# Patient Record
Sex: FEMALE | Race: WHITE | NOT HISPANIC OR LATINO | Employment: OTHER | URBAN - NONMETROPOLITAN AREA
[De-identification: names, ages, dates, MRNs, and addresses within clinical notes are randomized per-mention and may not be internally consistent; named-entity substitution may affect disease eponyms.]

---

## 2024-10-10 ENCOUNTER — APPOINTMENT (OUTPATIENT)
Dept: GENERAL RADIOLOGY | Facility: HOSPITAL | Age: 69
End: 2024-10-10
Payer: MEDICARE

## 2024-10-10 ENCOUNTER — APPOINTMENT (OUTPATIENT)
Dept: CT IMAGING | Facility: HOSPITAL | Age: 69
End: 2024-10-10
Payer: MEDICARE

## 2024-10-10 ENCOUNTER — HOSPITAL ENCOUNTER (OUTPATIENT)
Facility: HOSPITAL | Age: 69
Setting detail: OBSERVATION
Discharge: HOME | End: 2024-10-11
Attending: FAMILY MEDICINE | Admitting: FAMILY MEDICINE
Payer: MEDICARE

## 2024-10-10 DIAGNOSIS — I77.3 FIBROMUSCULAR DYSPLASIA OF CAROTID ARTERY: ICD-10-CM

## 2024-10-10 DIAGNOSIS — I69.328: ICD-10-CM

## 2024-10-10 DIAGNOSIS — R03.0 ELEVATED BLOOD PRESSURE READING: ICD-10-CM

## 2024-10-10 DIAGNOSIS — Z74.09 IMPAIRED MOBILITY: ICD-10-CM

## 2024-10-10 DIAGNOSIS — H53.9 VISUAL DISTURBANCES: ICD-10-CM

## 2024-10-10 DIAGNOSIS — R29.90 STROKE-LIKE SYMPTOMS: Primary | ICD-10-CM

## 2024-10-10 LAB
ALBUMIN SERPL-MCNC: 4.2 G/DL (ref 3.5–5.2)
ALBUMIN/GLOB SERPL: 1.2 G/DL
ALP SERPL-CCNC: 89 U/L (ref 39–117)
ALT SERPL W P-5'-P-CCNC: 41 U/L (ref 1–33)
ANION GAP SERPL CALCULATED.3IONS-SCNC: 11 MMOL/L (ref 5–15)
APTT PPP: 29 SECONDS (ref 24.5–36)
AST SERPL-CCNC: 48 U/L (ref 1–32)
BASOPHILS # BLD AUTO: 0.04 10*3/MM3 (ref 0–0.2)
BASOPHILS NFR BLD AUTO: 0.5 % (ref 0–1.5)
BILIRUB SERPL-MCNC: 0.3 MG/DL (ref 0–1.2)
BUN SERPL-MCNC: 8 MG/DL (ref 8–23)
BUN/CREAT SERPL: 12.1 (ref 7–25)
CALCIUM SPEC-SCNC: 9.7 MG/DL (ref 8.6–10.5)
CHLORIDE SERPL-SCNC: 102 MMOL/L (ref 98–107)
CO2 SERPL-SCNC: 29 MMOL/L (ref 22–29)
CREAT SERPL-MCNC: 0.66 MG/DL (ref 0.57–1)
DEPRECATED RDW RBC AUTO: 43.6 FL (ref 37–54)
EGFRCR SERPLBLD CKD-EPI 2021: 95.7 ML/MIN/1.73
EOSINOPHIL # BLD AUTO: 0.18 10*3/MM3 (ref 0–0.4)
EOSINOPHIL NFR BLD AUTO: 2.3 % (ref 0.3–6.2)
ERYTHROCYTE [DISTWIDTH] IN BLOOD BY AUTOMATED COUNT: 15.3 % (ref 12.3–15.4)
GEN 5 2HR TROPONIN T REFLEX: 7 NG/L
GLOBULIN UR ELPH-MCNC: 3.6 GM/DL
GLUCOSE BLDC GLUCOMTR-MCNC: 157 MG/DL (ref 70–130)
GLUCOSE SERPL-MCNC: 101 MG/DL (ref 65–99)
HCT VFR BLD AUTO: 50.8 % (ref 34–46.6)
HGB BLD-MCNC: 16.3 G/DL (ref 12–15.9)
IMM GRANULOCYTES # BLD AUTO: 0.01 10*3/MM3 (ref 0–0.05)
IMM GRANULOCYTES NFR BLD AUTO: 0.1 % (ref 0–0.5)
INR PPP: 0.9 (ref 0.91–1.09)
LYMPHOCYTES # BLD AUTO: 3.1 10*3/MM3 (ref 0.7–3.1)
LYMPHOCYTES NFR BLD AUTO: 38.9 % (ref 19.6–45.3)
MAGNESIUM SERPL-MCNC: 2 MG/DL (ref 1.6–2.4)
MCH RBC QN AUTO: 26.1 PG (ref 26.6–33)
MCHC RBC AUTO-ENTMCNC: 32.1 G/DL (ref 31.5–35.7)
MCV RBC AUTO: 81.3 FL (ref 79–97)
MONOCYTES # BLD AUTO: 0.4 10*3/MM3 (ref 0.1–0.9)
MONOCYTES NFR BLD AUTO: 5 % (ref 5–12)
NEUTROPHILS NFR BLD AUTO: 4.24 10*3/MM3 (ref 1.7–7)
NEUTROPHILS NFR BLD AUTO: 53.2 % (ref 42.7–76)
NRBC BLD AUTO-RTO: 0 /100 WBC (ref 0–0.2)
PLATELET # BLD AUTO: 208 10*3/MM3 (ref 140–450)
PMV BLD AUTO: 10.8 FL (ref 6–12)
POTASSIUM SERPL-SCNC: 3.7 MMOL/L (ref 3.5–5.2)
PROT SERPL-MCNC: 7.8 G/DL (ref 6–8.5)
PROTHROMBIN TIME: 12.5 SECONDS (ref 11.8–14.8)
RBC # BLD AUTO: 6.25 10*6/MM3 (ref 3.77–5.28)
SODIUM SERPL-SCNC: 142 MMOL/L (ref 136–145)
TROPONIN T DELTA: 0 NG/L
TROPONIN T SERPL HS-MCNC: 7 NG/L
WBC NRBC COR # BLD AUTO: 7.97 10*3/MM3 (ref 3.4–10.8)

## 2024-10-10 PROCEDURE — 96374 THER/PROPH/DIAG INJ IV PUSH: CPT

## 2024-10-10 PROCEDURE — 85610 PROTHROMBIN TIME: CPT | Performed by: FAMILY MEDICINE

## 2024-10-10 PROCEDURE — G0378 HOSPITAL OBSERVATION PER HR: HCPCS

## 2024-10-10 PROCEDURE — 25010000002 LABETALOL 5 MG/ML SOLUTION: Performed by: FAMILY MEDICINE

## 2024-10-10 PROCEDURE — 96375 TX/PRO/DX INJ NEW DRUG ADDON: CPT

## 2024-10-10 PROCEDURE — 85025 COMPLETE CBC W/AUTO DIFF WBC: CPT | Performed by: FAMILY MEDICINE

## 2024-10-10 PROCEDURE — 70450 CT HEAD/BRAIN W/O DYE: CPT

## 2024-10-10 PROCEDURE — 84484 ASSAY OF TROPONIN QUANT: CPT | Performed by: FAMILY MEDICINE

## 2024-10-10 PROCEDURE — 80053 COMPREHEN METABOLIC PANEL: CPT | Performed by: FAMILY MEDICINE

## 2024-10-10 PROCEDURE — 70498 CT ANGIOGRAPHY NECK: CPT

## 2024-10-10 PROCEDURE — 25010000002 HYDRALAZINE PER 20 MG: Performed by: FAMILY MEDICINE

## 2024-10-10 PROCEDURE — 93010 ELECTROCARDIOGRAM REPORT: CPT | Performed by: STUDENT IN AN ORGANIZED HEALTH CARE EDUCATION/TRAINING PROGRAM

## 2024-10-10 PROCEDURE — 82948 REAGENT STRIP/BLOOD GLUCOSE: CPT

## 2024-10-10 PROCEDURE — 25510000001 IOPAMIDOL PER 1 ML: Performed by: FAMILY MEDICINE

## 2024-10-10 PROCEDURE — 83735 ASSAY OF MAGNESIUM: CPT | Performed by: FAMILY MEDICINE

## 2024-10-10 PROCEDURE — 36415 COLL VENOUS BLD VENIPUNCTURE: CPT

## 2024-10-10 PROCEDURE — 85730 THROMBOPLASTIN TIME PARTIAL: CPT | Performed by: FAMILY MEDICINE

## 2024-10-10 PROCEDURE — 70496 CT ANGIOGRAPHY HEAD: CPT

## 2024-10-10 PROCEDURE — 93005 ELECTROCARDIOGRAM TRACING: CPT | Performed by: FAMILY MEDICINE

## 2024-10-10 PROCEDURE — 71045 X-RAY EXAM CHEST 1 VIEW: CPT

## 2024-10-10 PROCEDURE — 99285 EMERGENCY DEPT VISIT HI MDM: CPT

## 2024-10-10 RX ORDER — SODIUM CHLORIDE 0.9 % (FLUSH) 0.9 %
10 SYRINGE (ML) INJECTION AS NEEDED
Status: DISCONTINUED | OUTPATIENT
Start: 2024-10-10 | End: 2024-10-11 | Stop reason: SDUPTHER

## 2024-10-10 RX ORDER — SODIUM CHLORIDE 0.9 % (FLUSH) 0.9 %
10 SYRINGE (ML) INJECTION EVERY 12 HOURS SCHEDULED
Status: DISCONTINUED | OUTPATIENT
Start: 2024-10-10 | End: 2024-10-11 | Stop reason: HOSPADM

## 2024-10-10 RX ORDER — IOPAMIDOL 755 MG/ML
100 INJECTION, SOLUTION INTRAVASCULAR
Status: COMPLETED | OUTPATIENT
Start: 2024-10-10 | End: 2024-10-10

## 2024-10-10 RX ORDER — ACETAMINOPHEN 325 MG/1
650 TABLET ORAL EVERY 6 HOURS PRN
Status: DISCONTINUED | OUTPATIENT
Start: 2024-10-10 | End: 2024-10-11 | Stop reason: HOSPADM

## 2024-10-10 RX ORDER — ONDANSETRON 2 MG/ML
4 INJECTION INTRAMUSCULAR; INTRAVENOUS EVERY 6 HOURS PRN
Status: DISCONTINUED | OUTPATIENT
Start: 2024-10-10 | End: 2024-10-11 | Stop reason: HOSPADM

## 2024-10-10 RX ORDER — LABETALOL HYDROCHLORIDE 5 MG/ML
20 INJECTION, SOLUTION INTRAVENOUS ONCE
Status: COMPLETED | OUTPATIENT
Start: 2024-10-10 | End: 2024-10-10

## 2024-10-10 RX ORDER — SODIUM CHLORIDE 0.9 % (FLUSH) 0.9 %
10 SYRINGE (ML) INJECTION AS NEEDED
Status: DISCONTINUED | OUTPATIENT
Start: 2024-10-10 | End: 2024-10-11 | Stop reason: HOSPADM

## 2024-10-10 RX ORDER — ATORVASTATIN CALCIUM 40 MG/1
80 TABLET, FILM COATED ORAL NIGHTLY
Status: DISCONTINUED | OUTPATIENT
Start: 2024-10-10 | End: 2024-10-11

## 2024-10-10 RX ORDER — HYDRALAZINE HYDROCHLORIDE 20 MG/ML
20 INJECTION INTRAMUSCULAR; INTRAVENOUS ONCE
Status: COMPLETED | OUTPATIENT
Start: 2024-10-10 | End: 2024-10-10

## 2024-10-10 RX ORDER — BISOPROLOL FUMARATE AND HYDROCHLOROTHIAZIDE 2.5; 6.25 MG/1; MG/1
0.5 TABLET ORAL 2 TIMES DAILY
COMMUNITY
End: 2024-10-11 | Stop reason: HOSPADM

## 2024-10-10 RX ORDER — SODIUM CHLORIDE 9 MG/ML
40 INJECTION, SOLUTION INTRAVENOUS AS NEEDED
Status: DISCONTINUED | OUTPATIENT
Start: 2024-10-10 | End: 2024-10-11 | Stop reason: HOSPADM

## 2024-10-10 RX ADMIN — IOPAMIDOL 100 ML: 755 INJECTION, SOLUTION INTRAVENOUS at 17:04

## 2024-10-10 RX ADMIN — HYDRALAZINE HYDROCHLORIDE 20 MG: 20 INJECTION INTRAMUSCULAR; INTRAVENOUS at 19:19

## 2024-10-10 RX ADMIN — LABETALOL HYDROCHLORIDE 20 MG: 5 INJECTION, SOLUTION INTRAVENOUS at 17:41

## 2024-10-10 NOTE — ED PROVIDER NOTES
Subjective   History of Present Illness  61-year-old female states that she was having some vision problems out of her left eye.  She is having trouble seeing at the periphery.  She went to the eye doctor.  And there was concerns of a stroke.  She has no speech problems.  She has no weakness of the extremities.  No numbness and tingling of the extremities.  No facial asymmetry.  She states that she did take some cayenne pepper prior to arrival to the emergency room.  She states that her blood pressure was high and she also did take some blood pressure medications.  She states that the vision problems are nearly resolved at this time.      Review of Systems   Eyes:  Positive for visual disturbance.   All other systems reviewed and are negative.      No past medical history on file.    Allergies   Allergen Reactions    Latex Itching       No past surgical history on file.    No family history on file.    Social History     Socioeconomic History    Marital status:            Objective   Physical Exam  Vitals and nursing note reviewed.   Constitutional:       Appearance: Normal appearance.   HENT:      Head: Normocephalic and atraumatic.      Mouth/Throat:      Mouth: Mucous membranes are moist.   Eyes:      Extraocular Movements: Extraocular movements intact.      Pupils: Pupils are equal, round, and reactive to light.   Cardiovascular:      Rate and Rhythm: Normal rate and regular rhythm.      Heart sounds: Normal heart sounds.   Pulmonary:      Effort: Pulmonary effort is normal.      Breath sounds: Normal breath sounds.   Abdominal:      General: Bowel sounds are normal.      Palpations: Abdomen is soft.      Tenderness: There is no abdominal tenderness.   Skin:     General: Skin is warm and dry.   Neurological:      General: No focal deficit present.      Mental Status: She is alert and oriented to person, place, and time.      Cranial Nerves: No cranial nerve deficit.      Sensory: No sensory deficit.       Motor: No weakness.   Psychiatric:         Mood and Affect: Mood normal.         Behavior: Behavior normal.         Procedures           ED Course  ED Course as of 10/10/24 2109   Thu Oct 10, 2024   1727 NIH Stroke Scale/Score (NIHSS) - MDCalc  Calculated on Oct 10 2024 5:28 PM  0 points -> NIH Stroke Scale [RP]   1728 EKG rate 73  Normal sinus rhythm  No STEMI [RP]      ED Course User Index  [RP] Wyatt Swartz MD                          Total (NIH Stroke Scale): 1                Lab Results (last 24 hours)       Procedure Component Value Units Date/Time    CBC & Differential [[id number]]  (Abnormal) Collected: 10/10/24 1651    Specimen: Blood Updated: 10/10/24 1702    Narrative:      The following orders were created for panel order CBC & Differential.  Procedure                               Abnormality         Status                     ---------                               -----------         ------                     CBC Auto Differential[[id number]]        Abnormal            Final result                 Please view results for these tests on the individual orders.    Comprehensive Metabolic Panel [[id number]]  (Abnormal) Collected: 10/10/24 1651    Specimen: Blood Updated: 10/10/24 1727     Glucose 101 mg/dL      BUN 8 mg/dL      Creatinine 0.66 mg/dL      Sodium 142 mmol/L      Potassium 3.7 mmol/L      Comment: Slight hemolysis detected by analyzer. Result may be falsely elevated.        Chloride 102 mmol/L      CO2 29.0 mmol/L      Calcium 9.7 mg/dL      Total Protein 7.8 g/dL      Albumin 4.2 g/dL      ALT (SGPT) 41 U/L      AST (SGOT) 48 U/L      Comment: Slight hemolysis detected by analyzer. Result may be falsely elevated.        Alkaline Phosphatase 89 U/L      Total Bilirubin 0.3 mg/dL      Globulin 3.6 gm/dL      A/G Ratio 1.2 g/dL      BUN/Creatinine Ratio 12.1     Anion Gap 11.0 mmol/L      eGFR 95.7 mL/min/1.73     Narrative:      GFR Normal >60  Chronic Kidney Disease <60  Kidney Failure <15       Protime-INR [[id number]]  (Abnormal) Collected: 10/10/24 1651    Specimen: Blood Updated: 10/10/24 1711     Protime 12.5 Seconds      INR 0.90    aPTT [[id number]]  (Normal) Collected: 10/10/24 1651    Specimen: Blood Updated: 10/10/24 1711     PTT 29.0 seconds     High Sensitivity Troponin T [[id number]]  (Normal) Collected: 10/10/24 1651    Specimen: Blood Updated: 10/10/24 1721     HS Troponin T 7 ng/L     Narrative:      High Sensitive Troponin T Reference Range:  <14.0 ng/L- Negative Female for AMI  <22.0 ng/L- Negative Male for AMI  >=14 - Abnormal Female indicating possible myocardial injury.  >=22 - Abnormal Male indicating possible myocardial injury.   Clinicians would have to utilize clinical acumen, EKG, Troponin, and serial changes to determine if it is an Acute Myocardial Infarction or myocardial injury due to an underlying chronic condition.         Magnesium [[id number]]  (Normal) Collected: 10/10/24 1651    Specimen: Blood Updated: 10/10/24 1727     Magnesium 2.0 mg/dL     CBC Auto Differential [[id number]]  (Abnormal) Collected: 10/10/24 1651    Specimen: Blood Updated: 10/10/24 1702     WBC 7.97 10*3/mm3      RBC 6.25 10*6/mm3      Hemoglobin 16.3 g/dL      Hematocrit 50.8 %      MCV 81.3 fL      MCH 26.1 pg      MCHC 32.1 g/dL      RDW 15.3 %      RDW-SD 43.6 fl      MPV 10.8 fL      Platelets 208 10*3/mm3      Neutrophil % 53.2 %      Lymphocyte % 38.9 %      Monocyte % 5.0 %      Eosinophil % 2.3 %      Basophil % 0.5 %      Immature Grans % 0.1 %      Neutrophils, Absolute 4.24 10*3/mm3      Lymphocytes, Absolute 3.10 10*3/mm3      Monocytes, Absolute 0.40 10*3/mm3      Eosinophils, Absolute 0.18 10*3/mm3      Basophils, Absolute 0.04 10*3/mm3      Immature Grans, Absolute 0.01 10*3/mm3      nRBC 0.0 /100 WBC     High Sensitivity Troponin T 2Hr [[id number]]  (Normal) Collected: 10/10/24 1932    Specimen: Blood Updated: 10/10/24 2003     HS Troponin T 7 ng/L      Troponin T Delta 0 ng/L      Narrative:      High Sensitive Troponin T Reference Range:  <14.0 ng/L- Negative Female for AMI  <22.0 ng/L- Negative Male for AMI  >=14 - Abnormal Female indicating possible myocardial injury.  >=22 - Abnormal Male indicating possible myocardial injury.   Clinicians would have to utilize clinical acumen, EKG, Troponin, and serial changes to determine if it is an Acute Myocardial Infarction or myocardial injury due to an underlying chronic condition.               XR Chest 1 View   Final Result           Somewhat shallow inspiration, no acute cardiopulmonary abnormality.       This report was signed and finalized on 10/10/2024 5:10 PM by Dr. Robby Funes MD.          CT Head Without Contrast   Final Result   1. Chronic changes and no acute intracranial findings.        This report was signed and finalized on 10/10/2024 5:14 PM by Trevon Wheeler.          CT Angiogram Head w AI Analysis of LVO   Final Result       1. No arterial occlusion or flow-limiting stenosis in the neck. Question   fibromuscular dysplasia of the mid to distal left internal carotid   artery.   2. No intracranial large vessel occlusion or flow-limiting stenosis.       This report was signed and finalized on 10/10/2024 5:35 PM by Trevon Wheeler.          CT Angiogram Neck   Final Result       1. No arterial occlusion or flow-limiting stenosis in the neck. Question   fibromuscular dysplasia of the mid to distal left internal carotid   artery.   2. No intracranial large vessel occlusion or flow-limiting stenosis.       This report was signed and finalized on 10/10/2024 5:35 PM by Trevon Wheeler.            Medications   sodium chloride 0.9 % flush 10 mL (has no administration in time range)   iopamidol (ISOVUE-370) 76 % injection 100 mL (100 mL Intravenous Given 10/10/24 1704)   labetalol (NORMODYNE,TRANDATE) injection 20 mg (20 mg Intravenous Given 10/10/24 1741)   hydrALAZINE (APRESOLINE) injection 20 mg (20 mg Intravenous Given  10/10/24 1919)       Medical Decision Making  61-year-old female was hypertensive.  Have visual changes at the ophthalmology office.  Here she had her symptoms resolved.  Neuroexam was unremarkable.  Patient's hypertension did improve.  Case was discussed with Dr. Hernandez neurology on-call.  Case was discussed with Dr. Choi the hospitalist on-call.  She has accepted the patient under their services.    Problems Addressed:  Elevated blood pressure reading: complicated acute illness or injury  Stroke-like symptoms: complicated acute illness or injury  Visual disturbances: complicated acute illness or injury    Amount and/or Complexity of Data Reviewed  Labs: ordered. Decision-making details documented in ED Course.  Radiology: ordered. Decision-making details documented in ED Course.  ECG/medicine tests: ordered. Decision-making details documented in ED Course.  Discussion of management or test interpretation with external provider(s): Dr. Hernandez -neurology    Risk  Prescription drug management.  Decision regarding hospitalization.        Final diagnoses:   Stroke-like symptoms   Visual disturbances   Elevated blood pressure reading       ED Disposition  ED Disposition       ED Disposition   Decision to Admit    Condition   --    Comment   Level of Care: Telemetry [5]   Diagnosis: Stroke-like symptoms [512659]   Admitting Physician: CAROL ANN SENIOR [[id number]]   Attending Physician: CAROL ANN SENIOR [[id number]]                 No follow-up provider specified.       Medication List      No changes were made to your prescriptions during this visit.            Wyatt Swartz MD  10/10/24 4102

## 2024-10-11 ENCOUNTER — APPOINTMENT (OUTPATIENT)
Dept: MRI IMAGING | Facility: HOSPITAL | Age: 69
End: 2024-10-11
Payer: MEDICARE

## 2024-10-11 ENCOUNTER — READMISSION MANAGEMENT (OUTPATIENT)
Dept: CALL CENTER | Facility: HOSPITAL | Age: 69
End: 2024-10-11
Payer: MEDICARE

## 2024-10-11 ENCOUNTER — APPOINTMENT (OUTPATIENT)
Dept: CARDIOLOGY | Facility: HOSPITAL | Age: 69
End: 2024-10-11
Payer: MEDICARE

## 2024-10-11 VITALS
WEIGHT: 196.4 LBS | RESPIRATION RATE: 16 BRPM | HEART RATE: 84 BPM | OXYGEN SATURATION: 97 % | BODY MASS INDEX: 34.8 KG/M2 | SYSTOLIC BLOOD PRESSURE: 139 MMHG | HEIGHT: 63 IN | DIASTOLIC BLOOD PRESSURE: 77 MMHG | TEMPERATURE: 97.9 F

## 2024-10-11 PROBLEM — E11.9 TYPE 2 DIABETES MELLITUS: Status: ACTIVE | Noted: 2024-10-11

## 2024-10-11 PROBLEM — I63.9 STROKE: Status: ACTIVE | Noted: 2024-10-10

## 2024-10-11 PROBLEM — I10 ESSENTIAL HYPERTENSION: Status: ACTIVE | Noted: 2024-10-11

## 2024-10-11 PROBLEM — H53.9 VISUAL DISTURBANCE: Status: ACTIVE | Noted: 2024-10-11

## 2024-10-11 PROBLEM — I77.3 FIBROMUSCULAR DYSPLASIA OF CAROTID ARTERY: Status: ACTIVE | Noted: 2024-10-11

## 2024-10-11 LAB
BH CV ECHO MEAS - AO MAX PG: 20.6 MMHG
BH CV ECHO MEAS - AO MEAN PG: 9.3 MMHG
BH CV ECHO MEAS - AO ROOT DIAM: 2.7 CM
BH CV ECHO MEAS - AO V2 MAX: 227 CM/SEC
BH CV ECHO MEAS - AO V2 VTI: 38.7 CM
BH CV ECHO MEAS - AVA(I,D): 1.42 CM2
BH CV ECHO MEAS - EDV(CUBED): 55.7 ML
BH CV ECHO MEAS - EDV(MOD-SP2): 75.9 ML
BH CV ECHO MEAS - EDV(MOD-SP4): 59.1 ML
BH CV ECHO MEAS - EF(MOD-BP): 65.7 %
BH CV ECHO MEAS - EF(MOD-SP2): 61.5 %
BH CV ECHO MEAS - EF(MOD-SP4): 71.1 %
BH CV ECHO MEAS - ESV(CUBED): 9.1 ML
BH CV ECHO MEAS - ESV(MOD-SP2): 29.2 ML
BH CV ECHO MEAS - ESV(MOD-SP4): 17.1 ML
BH CV ECHO MEAS - FS: 45.3 %
BH CV ECHO MEAS - IVS/LVPW: 1.13 CM
BH CV ECHO MEAS - IVSD: 1.19 CM
BH CV ECHO MEAS - LA DIMENSION: 3.1 CM
BH CV ECHO MEAS - LAT PEAK E' VEL: 5.8 CM/SEC
BH CV ECHO MEAS - LV DIASTOLIC VOL/BSA (35-75): 30.8 CM2
BH CV ECHO MEAS - LV MASS(C)D: 139.4 GRAMS
BH CV ECHO MEAS - LV MAX PG: 6.5 MMHG
BH CV ECHO MEAS - LV MEAN PG: 3 MMHG
BH CV ECHO MEAS - LV SYSTOLIC VOL/BSA (12-30): 8.9 CM2
BH CV ECHO MEAS - LV V1 MAX: 127.5 CM/SEC
BH CV ECHO MEAS - LV V1 VTI: 24.2 CM
BH CV ECHO MEAS - LVIDD: 3.8 CM
BH CV ECHO MEAS - LVIDS: 2.09 CM
BH CV ECHO MEAS - LVOT AREA: 2.27 CM2
BH CV ECHO MEAS - LVOT DIAM: 1.7 CM
BH CV ECHO MEAS - LVPWD: 1.05 CM
BH CV ECHO MEAS - MED PEAK E' VEL: 6.1 CM/SEC
BH CV ECHO MEAS - MV A MAX VEL: 111 CM/SEC
BH CV ECHO MEAS - MV DEC TIME: 0.23 SEC
BH CV ECHO MEAS - MV E MAX VEL: 84.8 CM/SEC
BH CV ECHO MEAS - MV E/A: 0.76
BH CV ECHO MEAS - SV(LVOT): 54.9 ML
BH CV ECHO MEAS - SV(MOD-SP2): 46.7 ML
BH CV ECHO MEAS - SV(MOD-SP4): 42 ML
BH CV ECHO MEAS - SVI(LVOT): 28.6 ML/M2
BH CV ECHO MEAS - SVI(MOD-SP2): 24.4 ML/M2
BH CV ECHO MEAS - SVI(MOD-SP4): 21.9 ML/M2
BH CV ECHO MEAS - TR MAX PG: 14.9 MMHG
BH CV ECHO MEAS - TR MAX VEL: 193 CM/SEC
BH CV ECHO MEASUREMENTS AVERAGE E/E' RATIO: 14.25
BH CV ECHO SHUNT ASSESSMENT PERFORMED (HIDDEN SCRIPTING): 1
BH CV XLRA - RV BASE: 3 CM
BH CV XLRA - RV LENGTH: 5.6 CM
BH CV XLRA - RV MID: 2.38 CM
CHOLEST SERPL-MCNC: 207 MG/DL (ref 0–200)
GLUCOSE BLDC GLUCOMTR-MCNC: 119 MG/DL (ref 70–130)
GLUCOSE BLDC GLUCOMTR-MCNC: 124 MG/DL (ref 70–130)
GLUCOSE BLDC GLUCOMTR-MCNC: 138 MG/DL (ref 70–130)
HBA1C MFR BLD: 7 % (ref 4.8–5.6)
HDLC SERPL-MCNC: 59 MG/DL (ref 40–60)
LDLC SERPL CALC-MCNC: 131 MG/DL (ref 0–100)
LDLC/HDLC SERPL: 2.19 {RATIO}
LEFT ATRIUM VOLUME INDEX: 24.5 ML/M2
LEFT ATRIUM VOLUME: 47.1 ML
QT INTERVAL: 416 MS
QTC INTERVAL: 458 MS
TRIGL SERPL-MCNC: 93 MG/DL (ref 0–150)
VLDLC SERPL-MCNC: 17 MG/DL (ref 5–40)

## 2024-10-11 PROCEDURE — 93306 TTE W/DOPPLER COMPLETE: CPT

## 2024-10-11 PROCEDURE — G0378 HOSPITAL OBSERVATION PER HR: HCPCS

## 2024-10-11 PROCEDURE — 83036 HEMOGLOBIN GLYCOSYLATED A1C: CPT | Performed by: INTERNAL MEDICINE

## 2024-10-11 PROCEDURE — 93306 TTE W/DOPPLER COMPLETE: CPT | Performed by: EMERGENCY MEDICINE

## 2024-10-11 PROCEDURE — 70551 MRI BRAIN STEM W/O DYE: CPT

## 2024-10-11 PROCEDURE — 92523 SPEECH SOUND LANG COMPREHEN: CPT

## 2024-10-11 PROCEDURE — 80061 LIPID PANEL: CPT | Performed by: INTERNAL MEDICINE

## 2024-10-11 PROCEDURE — 97161 PT EVAL LOW COMPLEX 20 MIN: CPT | Performed by: PHYSICAL THERAPIST

## 2024-10-11 PROCEDURE — 97165 OT EVAL LOW COMPLEX 30 MIN: CPT | Performed by: OCCUPATIONAL THERAPIST

## 2024-10-11 PROCEDURE — 99214 OFFICE O/P EST MOD 30 MIN: CPT | Performed by: PSYCHIATRY & NEUROLOGY

## 2024-10-11 PROCEDURE — 82948 REAGENT STRIP/BLOOD GLUCOSE: CPT

## 2024-10-11 RX ORDER — GLIMEPIRIDE 1 MG/1
1 TABLET ORAL
Qty: 30 TABLET | Refills: 12 | Status: SHIPPED | OUTPATIENT
Start: 2024-10-11

## 2024-10-11 RX ORDER — HYDROCHLOROTHIAZIDE 25 MG/1
6.25 TABLET ORAL DAILY
Status: DISCONTINUED | OUTPATIENT
Start: 2024-10-11 | End: 2024-10-11 | Stop reason: HOSPADM

## 2024-10-11 RX ORDER — BISOPROLOL FUMARATE 5 MG/1
2.5 TABLET, FILM COATED ORAL DAILY
Status: DISCONTINUED | OUTPATIENT
Start: 2024-10-11 | End: 2024-10-11 | Stop reason: HOSPADM

## 2024-10-11 RX ORDER — NICOTINE POLACRILEX 4 MG
15 LOZENGE BUCCAL
Status: DISCONTINUED | OUTPATIENT
Start: 2024-10-11 | End: 2024-10-11 | Stop reason: HOSPADM

## 2024-10-11 RX ORDER — IBUPROFEN 600 MG/1
1 TABLET ORAL
Status: DISCONTINUED | OUTPATIENT
Start: 2024-10-11 | End: 2024-10-11 | Stop reason: HOSPADM

## 2024-10-11 RX ORDER — INSULIN LISPRO 100 [IU]/ML
2-7 INJECTION, SOLUTION INTRAVENOUS; SUBCUTANEOUS
Status: DISCONTINUED | OUTPATIENT
Start: 2024-10-11 | End: 2024-10-11 | Stop reason: HOSPADM

## 2024-10-11 RX ORDER — ASPIRIN 81 MG/1
81 TABLET ORAL DAILY
Status: DISCONTINUED | OUTPATIENT
Start: 2024-10-11 | End: 2024-10-11 | Stop reason: HOSPADM

## 2024-10-11 RX ORDER — ASPIRIN 81 MG/1
81 TABLET ORAL DAILY
Start: 2024-10-12

## 2024-10-11 RX ORDER — ROSUVASTATIN CALCIUM 20 MG/1
40 TABLET, COATED ORAL NIGHTLY
Status: DISCONTINUED | OUTPATIENT
Start: 2024-10-11 | End: 2024-10-11

## 2024-10-11 RX ORDER — ATORVASTATIN CALCIUM 40 MG/1
80 TABLET, FILM COATED ORAL NIGHTLY
Status: DISCONTINUED | OUTPATIENT
Start: 2024-10-11 | End: 2024-10-11 | Stop reason: HOSPADM

## 2024-10-11 RX ORDER — ROSUVASTATIN CALCIUM 40 MG/1
40 TABLET, COATED ORAL DAILY
Qty: 90 TABLET | Refills: 3 | Status: SHIPPED | OUTPATIENT
Start: 2024-10-11

## 2024-10-11 RX ORDER — DEXTROSE MONOHYDRATE 25 G/50ML
25 INJECTION, SOLUTION INTRAVENOUS
Status: DISCONTINUED | OUTPATIENT
Start: 2024-10-11 | End: 2024-10-11 | Stop reason: HOSPADM

## 2024-10-11 RX ADMIN — ASPIRIN 81 MG: 81 TABLET, COATED ORAL at 12:34

## 2024-10-11 RX ADMIN — Medication 10 ML: at 12:35

## 2024-10-11 NOTE — PLAN OF CARE
Goal Outcome Evaluation:  Plan of Care Reviewed With: patient, family        Progress: no change  Outcome Evaluation: Received pt from ED with stroke symtoms.  A&Ox4. NIH 1. Lt sided peripheral vision loss. BG monitoring. Dysphagia screen passed. Atorvastatin refused. Up standby to RR. Family at bs. Not sleeping well.

## 2024-10-11 NOTE — THERAPY DISCHARGE NOTE
Acute Care - Speech Language Pathology Initial Evaluation/Discharge  [facility]     Patient Name: Nayely Malik  : 1955  MRN: [id number]  Today's Date: 10/11/2024               Admit Date: 10/10/2024   SLP speech-language/cognitive evaluation complete. The pt was alert, cooperative, and oriented x4. Her daughter was present at bedside throughout evaluation. She was able to follow commands, answer questions, and demonstrate appropriate cognitive linguistic function throughout assessment. Her speech is noted to be clear and 100% intelligible a the conversation level. Pt's cognitive linguistic function is at baseline status and she does not warrant further skilled SLP intervention. Please re-consult if concerns arise.       Denver Garcia CCC-SLP 10/11/2024 13:48 CDT    Visit Dx:    ICD-10-CM ICD-9-CM   1. Stroke-like symptoms  R29.90 781.99   2. Visual disturbances  H53.9 368.9   3. Elevated blood pressure reading  R03.0 796.2   4. Impaired mobility [Z74.09]  Z74.09 799.89   5. 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  I69.328 438.10     Patient Active Problem List   Diagnosis    Stroke-like symptoms    Visual disturbance    Essential hypertension    Type 2 diabetes mellitus    Fibromuscular dysplasia of left carotid artery, possible     Past Medical History:   Diagnosis Date    Hypertension      Past Surgical History:   Procedure Laterality Date    GALLBLADDER SURGERY         SLP Recommendation and Plan  SLP Diagnosis: functional speech/language skills, functional cognitive-linguistic skills (10/11/24 0940)        [facility] Criteria for Skilled Therapy Interventions Met: no problems identified which require skilled intervention, baseline status (10/11/24 0940)  Anticipated Discharge Disposition (SLP): home (10/11/24 1347)     Therapy Frequency (SLP [facility] evaluation only (10/11/24 0940)                    Reason for Discharge: all goals and outcomes met, no further needs identified (10/11/24  4499)                Plan of Care Reviewed With: patient (10/11/24 1340)  Progress: no change (10/11/24 1340)    SLP EVALUATION (Last 72 Hours)       SLP SLC Evaluation       Row Name 10/11/24 6061                   Communication Assessment/Intervention    Document Type evaluation  -CS        Subjective Information no complaints  -CS        Patient Observations alert;cooperative;agree to therapy  -CS        Patient/Family/Caregiver Comments/Observations Daughter present  -CS        Patient Effort good  -CS           General Information    Patient Profile Reviewed yes  -CS        Pertinent History Of Current Problem HTN, visual deficits, CT of head- No acute intracranial abnormality.  -CS        Precautions/Limitations, Vision vision impairment, bilaterally  -CS        Precautions/Limitations, Hearing WFL  -CS        Prior Level of Function-Communication WFL  -CS        Plans/Goals Discussed with patient and family  -CS        Barriers to Rehab none identified  -CS        Patient's Goals for Discharge return to all previous roles/activities  -CS           Pain    Additional Documentation Pain Scale: FACES Pre/Post-Treatment (Group)  -CS           Pain Scale: FACES Pre/Post-Treatment    Pain: FACES Scale, Pretreatment 0-->no hurt  -CS        Posttreatment Pain Rating 0-->no hurt  -CS           Comprehension Assessment/Intervention    Comprehension Assessment/Intervention Auditory Comprehension  -CS           Auditory Comprehension Assessment/Intervention    Auditory Comprehension (Communication) WNL  -CS        Able to Identify Objects/Pictures (Communication) body part;familiar objects;pictures of common objects;WNL  -CS        Answers Questions (Communication) yes/no;wh questions  -CS        Able to Follow Commands (Communication) 1-step;2-step;WFL  -CS        Narrative Discourse conversational level;WFL  -CS           Expression Assessment/Intervention    Expression Assessment/Intervention verbal expression  -CS            Verbal Expression Assessment/Intervention    Verbal Expression WNL  -CS        Automatic Speech (Communication) WNL  -CS        Phrase Completion WNL  -CS        Confrontational Naming high frequency;WNL  -CS           Oral Motor Structure and Function    Oral Motor Structure and Function WNL  -CS           Oral Musculature and Cranial Nerve Assessment    Oral Motor General Assessment WFL  -CS           Motor Speech Assessment/Intervention    Motor Speech Function WNL  -CS           Cognitive Assessment Intervention- SLP    Cognitive Function (Cognition) WNL  -CS           SLP Evaluation Clinical Impressions    SLP Diagnosis functional speech/language skills;functional cognitive-linguistic skills  -CS        Rehab Potential/Prognosis excellent  -CS        SLC Criteria for Skilled Therapy Interventions Met no problems identified which require skilled intervention;baseline status  -CS        Functional Impact no impact on function  -CS           Recommendations    Therapy Frequency (SLP SLC) evaluation only  -CS        Anticipated Discharge Disposition (SLP) home  -CS           SLP Discharge Summary    Discharge Destination unknown  -CS        Reason for Discharge all goals and outcomes met, no further needs identified  -CS                  User Key  (r) = Recorded By, (t) = Taken By, (c) = Cosigned By      Initials Name Effective Dates    Denver Gan, TASNEEM-SLP 05/07/24 -                        EDUCATION  The patient has been educated in the following areas:   Communication Impairment.                    Time Calculation:    Time Calculation- SLP       Row Name 10/11/24 1346             Time Calculation- SLP    SLP Start Time 0940  -      SLP Stop Time 1035  -      SLP Time Calculation (min) 55 min  -      SLP Received On 10/11/24  -CS         Untimed Charges    SLP Eval/Re-eval  ST Eval Speech and Production w/ Language - 31622  -      26327-UV Eval Speech and Production w/ Language Minutes 55  -CS          Total Minutes    Untimed Charges Total Minutes 55  -CS       Total Minutes 55  -CS                User Key  (r) = Recorded By, (t) = Taken By, (c) = Cosigned By      Initials Name Provider Type    CS Denver Garcia CCC-SLP Speech and Language Pathologist                    Therapy Charges for Today       Code Description Service Date Service Provider Modifiers Qty    [id number] HC ST EVAL SPEECH AND PROD W LANG  4 10/11/2024 Denver Garcia CCC-SLP GN 1                     SLP Discharge Summary  Anticipated Discharge Disposition (SLP): home  Reason for Discharge: all goals and outcomes met, no further needs identified  Progress Toward Achieving Short/long Term Goals: all goals met within established timelines  Discharge Destination: unknown    KRISTA Escamilla  10/11/2024

## 2024-10-11 NOTE — ED NOTES
Nursing report ED to floor  Nayely Malik  69 y.o.  female    HPI:   Chief Complaint   Patient presents with    Loss of Vision       Admitting doctor:   Jennifer Mclaughlin DO    Consulting provider(s):  Consults       No orders found from 9/11/2024 to 10/11/2024.             Admitting diagnosis:   The primary encounter diagnosis was Stroke-like symptoms. Diagnoses of Visual disturbances and Elevated blood pressure reading were also pertinent to this visit.    Code status:   Current Code Status       Date Active Code Status Order ID Comments User Context       Not on file            Allergies:   Latex    Intake and Output  No intake or output data in the 24 hours ending 10/10/24 2052    Weight:       10/10/24  1616   Weight: 91.6 kg (202 lb)       Most recent vitals:   Vitals:    10/10/24 1919 10/10/24 2001 10/10/24 2041 10/10/24 2043   BP: 171/84 112/57 141/72 145/72   BP Location:       Patient Position:       Pulse:  77 85 92   Resp:       Temp:       TempSrc:       SpO2:  96% 100% 98%   Weight:       Height:         Oxygen Therapy: .    Active LDAs/IV Access:   Lines, Drains & Airways       Active LDAs       Name Placement date Placement time Site Days    Peripheral IV 10/10/24 1652 Left Antecubital 10/10/24  1652  Antecubital  less than 1                    Labs (abnormal labs have a star):   Labs Reviewed   COMPREHENSIVE METABOLIC PANEL - Abnormal; Notable for the following components:       Result Value    Glucose 101 (*)     ALT (SGPT) 41 (*)     AST (SGOT) 48 (*)     All other components within normal limits    Narrative:     GFR Normal >60  Chronic Kidney Disease <60  Kidney Failure <15     PROTIME-INR - Abnormal; Notable for the following components:    INR 0.90 (*)     All other components within normal limits   CBC WITH AUTO DIFFERENTIAL - Abnormal; Notable for the following components:    RBC 6.25 (*)     Hemoglobin 16.3 (*)     Hematocrit 50.8 (*)     MCH 26.1 (*)     All other components within  normal limits   APTT - Normal   TROPONIN - Normal    Narrative:     High Sensitive Troponin T Reference Range:  <14.0 ng/L- Negative Female for AMI  <22.0 ng/L- Negative Male for AMI  >=14 - Abnormal Female indicating possible myocardial injury.  >=22 - Abnormal Male indicating possible myocardial injury.   Clinicians would have to utilize clinical acumen, EKG, Troponin, and serial changes to determine if it is an Acute Myocardial Infarction or myocardial injury due to an underlying chronic condition.        MAGNESIUM - Normal   HIGH SENSITIVITIY TROPONIN T 2HR - Normal    Narrative:     High Sensitive Troponin T Reference Range:  <14.0 ng/L- Negative Female for AMI  <22.0 ng/L- Negative Male for AMI  >=14 - Abnormal Female indicating possible myocardial injury.  >=22 - Abnormal Male indicating possible myocardial injury.   Clinicians would have to utilize clinical acumen, EKG, Troponin, and serial changes to determine if it is an Acute Myocardial Infarction or myocardial injury due to an underlying chronic condition.        CBC AND DIFFERENTIAL    Narrative:     The following orders were created for panel order CBC & Differential.  Procedure                               Abnormality         Status                     ---------                               -----------         ------                     CBC Auto Differential[[id number]]        Abnormal            Final result                 Please view results for these tests on the individual orders.       Meds given in ED:   Medications   sodium chloride 0.9 % flush 10 mL (has no administration in time range)   iopamidol (ISOVUE-370) 76 % injection 100 mL (100 mL Intravenous Given 10/10/24 1704)   labetalol (NORMODYNE,TRANDATE) injection 20 mg (20 mg Intravenous Given 10/10/24 1741)   hydrALAZINE (APRESOLINE) injection 20 mg (20 mg Intravenous Given 10/10/24 1919)           NIH Stroke Scale:  Interval: baseline    Isolation/Infection(s):  No active isolations   No  active infections     COVID Testing  Collected .  Resulted .    Nursing report ED to floor:  Mental status: . axox4  Ambulatory status: . upx2  Precautions: .    ED nurse phone extentsion- ..

## 2024-10-11 NOTE — THERAPY EVALUATION
Patient Name: Nayely Malik  : 1955    MRN: [id number]                              Today's Date: 10/11/2024       Admit Date: 10/10/2024    Visit Dx:     ICD-10-CM ICD-9-CM   1. Stroke-like symptoms  R29.90 781.99   2. Visual disturbances  H53.9 368.9   3. Elevated blood pressure reading  R03.0 796.2   4. Impaired mobility [Z74.09]  Z74.09 799.89     Patient Active Problem List   Diagnosis    Stroke-like symptoms    Visual disturbance    Essential hypertension    Type 2 diabetes mellitus    Fibromuscular dysplasia of left carotid artery, possible     Past Medical History:   Diagnosis Date    Hypertension      Past Surgical History:   Procedure Laterality Date    GALLBLADDER SURGERY        General Information       Row Name 10/11/24 0930          Physical Therapy Time and Intention    Document Type evaluation  vision changes, blurry vision, loss of vision  -MS     Mode of Treatment physical therapy;individual therapy  -MS       Row Name 10/11/24 0930          General Information    Patient Profile Reviewed yes  -MS     Prior Level of Function independent:;all household mobility;community mobility;ADL's;driving  -MS     Existing Precautions/Restrictions --  impaired vision on L  -MS     Barriers to Rehab visual deficit  -MS       Row Name 10/11/24 0930          Living Environment    People in Home spouse  -MS       Row Name 10/11/24 0930          Home Main Entrance    Number of Stairs, Main Entrance six  -MS     Stair Railings, Main Entrance railings on both sides of stairs  -MS       Row Name 10/11/24 0930          Stairs Within Home, Primary    Number of Stairs, Within Home, Primary none  -MS       Row Name 10/11/24 0930          Cognition    Orientation Status (Cognition) oriented x 4  -MS       Row Name 10/11/24 0930          Safety Issues, Functional Mobility    Impairments Affecting Function (Mobility) visual/perceptual  -MS               User Key  (r) = Recorded By, (t) = Taken By, (c) = Cosigned By       Initials Name Provider Type    Olive Coles R, PT, DPT, NCS Physical Therapist                   Mobility       Row Name 10/11/24 0930          Bed Mobility    Bed Mobility supine-sit;sit-supine  -MS     Supine-Sit [place] (Bed Mobility) independent  -MS     Sit-Supine [place] (Bed Mobility) independent  -MS       Row Name 10/11/24 0930          Sit-Stand Transfer    Sit-Stand [place] (Transfers) supervision  -MS       Row Name 10/11/24 0930          Gait/Stairs (Locomotion)    [place] Level (Gait) standby assist  -MS     Distance in Feet (Gait) 150  -MS     Comment, (Gait/Stairs) pt ambulated with head turns and around objects and stepping over 3 in objects. She requires cues to look far to the L  -MS               User Key  (r) = Recorded By, (t) = Taken By, (c) = Cosigned By      Initials Name Provider Type    Olive Coles, PT, DPT, NCS Physical Therapist                   Obj/Interventions       Row Name 10/11/24 0930          Range of Motion Comprehensive    General Range of Motion bilateral upper extremity ROM WFL;bilateral lower extremity ROM WFL  -MS     Comment, General Range of Motion pt did have a slight R pronator drift  -MS       Row Name 10/11/24 0930          Strength Comprehensive (MMT)    General Manual Muscle Testing (MMT) Assessment no strength deficits identified  -MS       Row Name 10/11/24 0930          Motor Skills    Motor Skills coordination  -MS     Coordination WNL;bilateral;finger to nose;heel to shin  -MS       Row Name 10/11/24 0930          Balance    Balance Assessment sitting static balance;sitting dynamic balance;standing static balance;standing dynamic balance;system impairments  -MS     Static Sitting Balance independent  -MS     Dynamic Sitting Balance independent  -MS     Static Standing Balance standby assist  -MS     Dynamic Standing Balance standby assist  -MS     Position/Device Used, Standing Balance unsupported  -MS     System  Impairments Contributing to Balance Disturbance visual  L homonymous hemianopia  -MS       Row Name 10/11/24 0930          Sensory Assessment (Somatosensory)    Sensory Assessment (Somatosensory) sensation intact  -MS               User Key  (r) = Recorded By, (t) = Taken By, (c) = Cosigned By      Initials Name Provider Type    MS Olive Fonseca, PT, DPT, NCS Physical Therapist                   Goals/Plan       Row Name 10/11/24 0935          Transfer Goal 1 (PT)    Activity/Assistive Device (Transfer Goal 1, PT) sit-to-stand/stand-to-sit;bed-to-chair/chair-to-bed  -MS     [place] Level/Cues Needed (Transfer Goal 1, PT) independent  -MS     Time Frame (Transfer Goal 1, PT) long term goal (LTG);by discharge  -MS     Progress/Outcome (Transfer Goal 1, PT) new goal  -MS       Row Name 10/11/24 0935          Gait Training Goal 1 (PT)    Activity/Assistive Device (Gait Training Goal 1, PT) gait (walking locomotion);decrease fall risk;increase endurance/gait distance;improve balance and speed  -MS     [place] Level (Gait Training Goal 1, PT) independent  -MS     Distance (Gait Training Goal 1, PT) 200ft  -MS     Time Frame (Gait Training Goal 1, PT) long term goal (LTG);by discharge  -MS     Progress/Outcome (Gait Training Goal 1, PT) new goal  -MS       Row Name 10/11/24 0935          Therapy Assessment/Plan (PT)    Planned Therapy Interventions (PT) balance training;bed mobility training;gait training;patient/family education;transfer training  -MS               User Key  (r) = Recorded By, (t) = Taken By, (c) = Cosigned By      Initials Name Provider Type    Olive Coles, PT, DPT, NCS Physical Therapist                   Clinical Impression       Row Name 10/11/24 0935          Pain    Pretreatment Pain Rating 1/10  impaired vision on L  -MS     Posttreatment Pain Rating 1/10  -MS     Pain Location - head  -MS       Row Name 10/11/24 0935          Plan of Care Review    Plan of Care Reviewed With  patient;grandchild(urbano)  -MS     Progress no change  -MS     Outcome Evaluation The patient presents alert and oriented x4 lying in bed with granddaughter present at bedside. She demonstrates a L hemonoymous hemianopia that she is doing well to compensate for. She did require a few cues during gait to look to the far L for safety. She does report feeling light headed during changes in position and during ambulation. She is slightly unsteady during gait which is most likely due to her lightheadedness. She was able to ambulate with head turns, around objects, and over objects. She was educated on the need for outpt therapy is the lightheadedness does not resolve in 2 weeks. PT will continue to work with her to improve her gait safety. Recommend discharge home with assist.  -MS       Row Name 10/11/24 09          Therapy Assessment/Plan (PT)    Patient/Family Therapy Goals Statement (PT) go home  -MS     Criteria for Skilled Interventions Met (PT) no problems identified which require skilled intervention  -MS     Therapy Frequency (PT) evaluation only  -MS     Predicted Duration of Therapy Intervention (PT) until discharge  -MS       Row Name 10/11/24 0939          Positioning and Restraints    Post Treatment Position bed  -MS     In Bed fowlers;call light within reach;encouraged to call for assist;with family/caregiver  -MS               User Key  (r) = Recorded By, (t) = Taken By, (c) = Cosigned By      Initials Name Provider Type    Olive Coles R, PT, DPT, NCS Physical Therapist                   Outcome Measures       Row Name 10/11/24 0967          How much help from another person do you currently need...    Turning from your back to your side while in flat bed without using bedrails? 4  -MS     Moving from lying on back to sitting on the side of a flat bed without bedrails? 4  -MS     Moving to and from a bed to a chair (including a wheelchair)? 4  -MS     Standing up from a chair using your arms (e.g.,  wheelchair, bedside chair)? 4  -MS     Climbing 3-5 steps with a railing? 4  -MS     To walk in hospital room? 4  -MS     AM-PAC 6 Clicks Score (PT) 24  -MS     Highest Level of Mobility Goal 8 --> Walked 250 feet or more  -MS       Row Name 10/11/24 0935 10/11/24 0720       Modified [place] Scale    Pre-Stroke [place] Scale -- 0 - No Symptoms at all.  -JJ    [place] Scale 2 - Slight disability.  Unable to carry out all previous activities but able to look after own affairs without assistance.  -MS 3 - Moderate disability.  Requiring some help, but able to walk without assistance.  -JJ      Row Name 10/11/24 0935 10/11/24 0720       Functional Assessment    Outcome Measure Options AM-PAC 6 Clicks Basic Mobility (PT);[place]  -MS AM-PAC 6 Clicks Daily Activity (OT);[place]  -JJ              User Key  (r) = Recorded By, (t) = Taken By, (c) = Cosigned By      Initials Name Provider Type    MS Olive Fonseca, PT, DPT, NCS Physical Therapist    Lashonda Nava, OTR/L, CSRS Occupational Therapist                                 Physical Therapy Education       Title: PT OT SLP Therapies (In Progress)       Topic: Physical Therapy (In Progress)       Point: Mobility training (Done)       Learning Progress Summary             Patient Acceptance, E, VU by MS at 10/11/2024 0940    Comment: role of PT in her care and compensation for visual loss                         Point: Home exercise program (Not Started)       Learner Progress:  Not documented in this visit.              Point: Body mechanics (Not Started)       Learner Progress:  Not documented in this visit.              Point: Precautions (Not Started)       Learner Progress:  Not documented in this visit.                              User Key       Initials Effective Dates Name Provider Type Discipline    MS 07/11/23 -  Olive Fonseca, PT, DPT, NCS Physical Therapist PT                  PT Recommendation and Plan  Planned  Therapy Interventions (PT): balance training, bed mobility training, gait training, patient/family education, transfer training  Plan of Care Reviewed With: patient, grandchild(urbano)  Progress: no change  Outcome Evaluation: The patient presents alert and oriented x4 lying in bed with granddaughter present at bedside. She demonstrates a L hemonoymous hemianopia that she is doing well to compensate for. She did require a few cues during gait to look to the far L for safety. She does report feeling light headed during changes in position and during ambulation. She is slightly unsteady during gait which is most likely due to her lightheadedness. She was able to ambulate with head turns, around objects, and over objects. She was educated on the need for outpt therapy is the lightheadedness does not resolve in 2 weeks. PT will continue to work with her to improve her gait safety. Recommend discharge home with assist.     Time Calculation:         PT Charges       Row Name 10/11/24 0835             Time Calculation    Start Time 0935  -MS      Stop Time 1020  -MS      Time Calculation (min) 45 min  -MS      PT Received On 10/11/24  -MS      PT Goal Re-Cert Due Date 10/21/24  -MS         Untimed Charges    PT Eval/Re-eval Minutes 45  -MS         Total Minutes    Untimed Charges Total Minutes 45  -MS       Total Minutes 45  -MS                User Key  (r) = Recorded By, (t) = Taken By, (c) = Cosigned By      Initials Name Provider Type    Olive Coles, PT, DPT, NCS Physical Therapist                      PT G-Codes  Outcome Measure Options: AM-PAC 6 Clicks Basic Mobility (PT), Modified [place]  AM-PAC 6 Clicks Score (PT): 24  AM-PAC 6 Clicks Score (OT): 21  Modified [place] Scale: 2 - Slight disability.  Unable to carry out all previous activities but able to look after own affairs without assistance.  PT Discharge Summary  Anticipated Discharge Disposition (PT): home with assist    Olive Fonseca, PT, DPT,  NCS  10/11/2024

## 2024-10-11 NOTE — CONSULTS
Neurology Consult Note    Consult Date: 10/11/2024  Referring MD: Jennifer Mclaughlin DO  Reason for Consult: visual changes    Patient: Nayely Malik (69 y.o. female)  MRN: [id number]  : 1955    History of Present Illness:   Nayely Malik is a 69 y.o. female who is relatively healthy.  She only has a history of hypertension which she takes Ziac 2.5-6.25 mg 1/2 tablet/day.  Her granddaughter is in the room with her and is an excellent historian.  Yesterday morning the patient got up and drank 2 cups of coffee.  She was having some joint pain and therefore took a supplement called Curamed.  Within 1 hour she noticed some vision changes.  She believes that she had left peripheral vision loss in both eyes.  She also endorsed that her head did not feel right.  She had some visual floaters as well.  She also had some spots in her vision of decreased visual acuity.  Her symptoms were initially somewhat undulating and she described it as coming in waves.  She was concerned about a stroke and therefore had several tablets of cayenne pepper that she placed in water and drink the water.  She believes that this might abort a stroke.  She also took several sips of mistletoe which she also believes is beneficial for an acute stroke.  Her initial blood pressure was 186/102.  The family called a cousin of the patient's granddaughter.  She reportedly works at the ophthalmology practice.  They drove from [place] to [place] and in route the patient became somewhat foggy headed and also lost her bearings forgetting where they were.  When she arrived at the ophthalmology office she was not acting appropriately per the granddaughter.  They immediately noticed that she had a left visual field cut and therefore the ophthalmologist recommended emergent transport to the ER.  When she arrived to the ER her blood pressure was found to be elevated.  She took more cayenne pepper on the way here.  As she was arriving in the ER her  vision was somewhat improving.  She was then given a beta-blocker and hydralazine which dropped her pressure precipitously.  Her vision worsened after this.  She also endorses several months of tinnitus.  She continues to have it this morning.  She was admitted overnight.  A CT of the head without contrast and CT angiography was unremarkable.    Initial vital signs in ER and subsequent to their:        Medical History:   Past Medical/Surgical Hx:  Past Medical History:   Diagnosis Date    Hypertension      Past Surgical History:   Procedure Laterality Date    GALLBLADDER SURGERY         Medications On Admission:  Medications Prior to Admission   Medication Sig Dispense Refill Last Dose    bisoprolol-hydrochlorothiazide (ZIAC) 2.5-6.25 MG per tablet Take 0.5 tablets by mouth Daily.   Patient Taking Differently       Current Medications:    Current Facility-Administered Medications:     acetaminophen (TYLENOL) tablet 650 mg, 650 mg, Oral, Q6H PRN, Jennifer Mclaughlin DO    aspirin EC tablet 81 mg, 81 mg, Oral, Daily, Jasmyn Archer APRN    atorvastatin (LIPITOR) tablet 80 mg, 80 mg, Oral, Nightly, Jennifer Mclaughlin DO    [Held by provider] bisoprolol (ZEBeta) tablet 2.5 mg, 2.5 mg, Oral, Daily **AND** [Held by provider] hydroCHLOROthiazide tablet 6.25 mg, 6.25 mg, Oral, Daily, Jasmyn Archer APRN    dextrose (D50W) (25 g/50 mL) IV injection 25 g, 25 g, Intravenous, Q15 Min PRN, Jasmyn Archer APRN    dextrose (GLUTOSE) oral gel 15 g, 15 g, Oral, Q15 Min PRN, Jasmyn Archer APRN    glucagon (GLUCAGEN) injection 1 mg, 1 mg, Intramuscular, Q15 Min PRN, Jasmyn Archer APRN    Insulin Lispro (humaLOG) injection 2-7 Units, 2-7 Units, Subcutaneous, 4x Daily AC & at Bedtime, Jasmyn Archer APRN    ondansetron (ZOFRAN) injection 4 mg, 4 mg, Intravenous, Q6H PRN, Jennifer Mclaughlin DO    [COMPLETED] Insert Peripheral IV, , , Once **AND** sodium chloride 0.9 % flush 10 mL, 10 mL, Intravenous, PRN, Wyatt Swartz  "MD NEAL    sodium chloride 0.9 % flush 10 mL, 10 mL, Intravenous, Q12H, Jennifer Mclaughlin,     sodium chloride 0.9 % flush 10 mL, 10 mL, Intravenous, PRN, Jennifer Mclaughlin DO    sodium chloride 0.9 % infusion 40 mL, 40 mL, Intravenous, PRN, Jennifer Mclaughlin,      Allergies:  Allergies   Allergen Reactions    Latex Itching       Social Hx:  Social History     Socioeconomic History    Marital status:    Tobacco Use    Smoking status: Never    Smokeless tobacco: Never   Vaping Use    Vaping status: Never Used   Substance and Sexual Activity    Alcohol use: Never    Drug use: Never    Sexual activity: Defer       Family Hx:  History reviewed. No pertinent family history.  Physical Examination:   Vital Signs:  Vitals:    10/10/24 2100 10/10/24 2327 10/11/24 0344 10/11/24 0806   BP: 144/66 155/71 121/57 124/69   BP Location: Right arm Right arm Right arm Left arm   Patient Position: Lying Lying Lying Sitting   Pulse: 87 91 82 90   Resp: 18 20 16 16   Temp: 97.4 °F (36.3 °C) 97.7 °F (36.5 °C) 97.5 °F (36.4 °C) 98 °F (36.7 °C)   TempSrc: Oral Oral Oral Oral   SpO2: 100% 99% 98% 97%   Weight: 89.1 kg (196 lb 6.4 oz)      Height: 160 cm (63\")            General Exam:  Head:  Normocephalic, atraumatic  HEENT:  Neck supple  Fundoscopic Exam:  No signs of disc edema  CVS:  Regular rate and rhythm.  No murmurs  Carotid Examination:  No bruits  Lungs:  Clear to auscultation  Abdomen:  Nontender, Nondistended  Extremities:  No signs of peripheral edema  Skin:  No rashes    Neurologic Exam:    Mental Status:    -Awake, Alert, Oriented X 3  -No word finding difficulties  -No aphasia  -No dysarthria  -Follows simple and complex commands    CN II: Left homonymous hemianopsia. pupils equally reactive to light  CN III, IV, VI:  Extraocular Muscles full with no signs of nystagmus  CN V:  Facial sensory is symmetric with no asymmetries.  CN VII:  Facial motor symmetric  CN VIII:  Gross hearing intact bilaterally  CN IX:  " Palate elevates symmetrically  CN X:  Palate elevates symmetrically  CN XI:  Shoulder shrug symmetric  CN XII:  Tongue is midline on protrusion    Motor: (strength out of 5:  1= minimal movement, 2 = movement in plane of gravity, 3 = movement against gravity, 4 = movement against some resistance, 5 = full strength)    -Right Upper Ext: Proximal: 5 Distal: 5  -Left Upper Ext: Proximal: 5 Distal: 5    -Right Lower Ext: Proximal: 5 Distal: 5  -Left Lower Ext: Proximal: 5 Distal: 5    DTR:  -Right   Biceps: 2+ Triceps: 2+ Brachioradialis: 2+   Patella: 2+ Ankle: 2+ Neg Babinski  -Left   Biceps: 2+ Triceps: 2+ Brachioradialis: 2+   Patella: 2+ Ankle: 2+ Neg Babinski    Sensory:  -Intact to light touch, pinprick, temperature, pain, and proprioception    Coordination:  -Finger to nose intact  -Heel to shin intact  -No ataxia    Gait  -No signs of ataxia  -ambulates unassisted    Recent Diagnostics:   Laboratory Results:   - Reviewed in EMR  Lab Results   Component Value Date    GLUCOSE 101 (H) 10/10/2024    CALCIUM 9.7 10/10/2024     10/10/2024    K 3.7 10/10/2024    CO2 29.0 10/10/2024     10/10/2024    BUN 8 10/10/2024    CREATININE 0.66 10/10/2024    BCR 12.1 10/10/2024    ANIONGAP 11.0 10/10/2024     Lab Results   Component Value Date    WBC 7.97 10/10/2024    HGB 16.3 (H) 10/10/2024    HCT 50.8 (H) 10/10/2024    MCV 81.3 10/10/2024     10/10/2024     Lab Results   Component Value Date    PTT 29.0 10/10/2024    INR 0.90 (L) 10/10/2024     Lab Results   Component Value Date    TRIG 93 10/11/2024    HDL 59 10/11/2024     (H) 10/11/2024     Lab Results   Component Value Date    HGBA1C 7.00 (H) 10/11/2024       Imaging Results:  Imaging Results (Last 24 Hours)       Procedure Component Value Units Date/Time    CT Angiogram Head w AI Analysis of LVO [[id number]] Collected: 10/10/24 1730     Updated: 10/10/24 1738    Narrative:      EXAM: CT ANGIOGRAM HEAD W AI ANALYSIS OF LVO-, CT ANGIOGRAM NECK-  -  10/10/2024 3:59 PM     HISTORY: Visual changes/strokelike symptoms       COMPARISON: None available.     DOSE LENGTH PRODUCT: 397.83 mGy.cm. Automated exposure control was also  utilized to decrease patient radiation dose.     TECHNIQUE: CTA head and neck performed with intravenous contrast. 3D  postprocessing, including MIPs, performed and images saved to PACS. AI  ANALYSIS OF LVO WAS UTILIZED.  Grading of carotid stenosis performed  with NASCET criteria.     FINDINGS:     CTA HEAD: Distal ICAs are patent and without flow-limiting stenosis. No  intracranial large vessel occlusion. Anterior and middle cerebral  arteries are patent and without flow-limiting stenosis. Intracranial  vertebral arteries and basilar artery are normal in caliber. Posterior  cerebral arteries and poterior circulation are patent and normal in  caliber. No visualized aneurysm or vascular malformation. The dural  venous sinuses are unremarkable with no filling defect. There is  calcification of the cavernous portion of the bilateral intracranial  internal carotid arteries but no significant stenosis is seen.     CTA NECK: The great vessels originate normally from the aortic arch.  There is mild atherosclerosis at the aortic arch.. Common carotid  arteries are patent and without flow-limiting stenosis. There is mild  atherosclerosis at the bilateral carotid bulbs without significant  stenosis. There is a beaded appearance of the mid to distal left  internal carotid artery may be due to fibromuscular dysplasia. The  vertebral arteries are opacified without significant ostial narrowing or  stenosis.  No evidence of vertebral artery dissection or pseudoaneurysm.     The submitted soft tissues of the neck are unremarkable..  Lung apices are clear.       Impression:         1. No arterial occlusion or flow-limiting stenosis in the neck. Question  fibromuscular dysplasia of the mid to distal left internal carotid  artery.  2. No intracranial large  vessel occlusion or flow-limiting stenosis.     This report was signed and finalized on 10/10/2024 5:35 PM by Trevon Wheeler.       CT Angiogram Neck [[id number]] Collected: 10/10/24 1730     Updated: 10/10/24 1738    Narrative:      EXAM: CT ANGIOGRAM HEAD W AI ANALYSIS OF LVO-, CT ANGIOGRAM NECK- -  10/10/2024 3:59 PM     HISTORY: Visual changes/strokelike symptoms       COMPARISON: None available.     DOSE LENGTH PRODUCT: 397.83 mGy.cm. Automated exposure control was also  utilized to decrease patient radiation dose.     TECHNIQUE: CTA head and neck performed with intravenous contrast. 3D  postprocessing, including MIPs, performed and images saved to PACS. AI  ANALYSIS OF LVO WAS UTILIZED.  Grading of carotid stenosis performed  with NASCET criteria.     FINDINGS:     CTA HEAD: Distal ICAs are patent and without flow-limiting stenosis. No  intracranial large vessel occlusion. Anterior and middle cerebral  arteries are patent and without flow-limiting stenosis. Intracranial  vertebral arteries and basilar artery are normal in caliber. Posterior  cerebral arteries and poterior circulation are patent and normal in  caliber. No visualized aneurysm or vascular malformation. The dural  venous sinuses are unremarkable with no filling defect. There is  calcification of the cavernous portion of the bilateral intracranial  internal carotid arteries but no significant stenosis is seen.     CTA NECK: The great vessels originate normally from the aortic arch.  There is mild atherosclerosis at the aortic arch.. Common carotid  arteries are patent and without flow-limiting stenosis. There is mild  atherosclerosis at the bilateral carotid bulbs without significant  stenosis. There is a beaded appearance of the mid to distal left  internal carotid artery may be due to fibromuscular dysplasia. The  vertebral arteries are opacified without significant ostial narrowing or  stenosis.  No evidence of vertebral artery dissection or  pseudoaneurysm.     The submitted soft tissues of the neck are unremarkable..  Lung apices are clear.       Impression:         1. No arterial occlusion or flow-limiting stenosis in the neck. Question  fibromuscular dysplasia of the mid to distal left internal carotid  artery.  2. No intracranial large vessel occlusion or flow-limiting stenosis.     This report was signed and finalized on 10/10/2024 5:35 PM by Trevon Wheeler.       CT Head Without Contrast [[id number]] Collected: 10/10/24 1710     Updated: 10/10/24 1717    Narrative:      EXAM: CT HEAD WO CONTRAST-      DATE: 10/10/2024 3:59 PM     HISTORY: Visual changes/strokelike symptoms       COMPARISON: None available.     DOSE LENGTH PRODUCT: 660.75 mGy.cm  Automated exposure control was also  utilized to decrease patient radiation dose.     TECHNIQUE: Unenhanced CT images obtained from vertex to skull base with  multiplanar reformats.     FINDINGS:  There is no acute intracranial hemorrhage, midline shift, mass effect,  or hydrocephalus. There is no CT evidence for acute infarct.     There are chronic changes with volume loss and chronic small vessel  ischemic change of the periventricular white matter.      SOFT TISSUES: The scalp soft tissues are unremarkable.        SINUS:The visualized paranasal sinuses and mastoid air cells are clear.      ORBITS: The visualized orbits and globes are unremarkable.          Impression:      1. Chronic changes and no acute intracranial findings.      This report was signed and finalized on 10/10/2024 5:14 PM by Trevon Wheeler.       XR Chest 1 View [[id number]] Collected: 10/10/24 1709     Updated: 10/10/24 1713    Narrative:      EXAMINATION: XR CHEST 1 VW- 10/10/2024 5:09 PM     HISTORY: Stroke evaluation.     COMPARISON: Chest x-ray 10/1/2007.     REPORT:  A frontal view of the chest was obtained. The lungs are clear and mildly  hypoexpanded, with mild central vascular crowding. The cardiac and  mediastinal  silhouettes are within normal limits. The visualized bony  thorax and upper abdomen are unremarkable.                                                                                                                                                       Impression:             Somewhat shallow inspiration, no acute cardiopulmonary abnormality.     This report was signed and finalized on 10/10/2024 5:10 PM by Dr. Robby Funes MD.                Other labs:  Lipid panel shows an LDL of 131  Hemoglobin A1c is elevated at 7  Other RAD:  CT of head without contrast and CT angiography shows no stenosis and no occlusions.  CTA of neck shows no arterial occlusion or flow-limiting stenosis in the neck. Question fibromuscular dysplasia of the mid to distal left internal carotid artery.      Assessment & Plan:   Patient presenting with a left homonymous hemianopsia concerning for PRES versus stroke.    Impression:  Left homonymous hemianopsia concerning for infarct  Radiographic concern of left internal carotid artery fibromuscular dysplasia.  Malignant hypertension on presentation with precipitous drop after the addition of 2 antihypertensives  History of hypertension treated with single therapy  Possible new diagnosis of hyperlipidemia  Possible new diagnosis of hyperglycemia with hemoglobin A1c of 7      Plan:   MRI of brain  Cardiac echo  Aspirin 81 mg  Lipitor 80 mg  Physical, occupational, and speech therapy consultations  Cardiac telemetry  Renal ultrasound to see if there is any ultrasonography evidence of FMD.  We may also consider a CT angiography of the renal arteries.  It may be reasonable to do an outpatient referral to vascular surgery as there is no one on-call today.  I do not believe that this is emergent enough that it requires urgent transfer to another facility.    David Hernandez MD  10/11/24  08:50 CDT    Medical Decision Making    Number/Complexity of Problems  Moderate  1 undiagnosed new problem  with uncertain prognosis -   1 acute illness with systemic symptoms -   High  1 acute or chronic illness that poses a threat to life/body function -   High     MDM Data  Moderate - 1/3 categories  Extensive - 2/3 categories    Category 1: 3 of the following  Review of external notes  Review of results  Ordering of each unique test  Independent historian  Category 2:  Independent interpretation of test (ex: imaging)  Category 3:  Discussion of management with another provider    Extensive     Treatment Plan  Moderate - Prescription Drug management  High  Drug therapy requiring intensive monitoring for toxicity  Decision regarding hospitalization or escalation of care  De-escalate care/DNR decisions

## 2024-10-11 NOTE — PLAN OF CARE
Goal Outcome Evaluation:  Plan of Care Reviewed With: patient, grandchild(urbano)        Progress: no change  Outcome Evaluation: The patient presents alert and oriented x4 lying in bed with granddaughter present at bedside. She demonstrates a L hemonoymous hemianopia that she is doing well to compensate for. She did require a few cues during gait to look to the far L for safety. She does report feeling light headed during changes in position and during ambulation. She is slightly unsteady during gait which is most likely due to her lightheadedness. She was able to ambulate with head turns, around objects, and over objects. She was educated on the need for outpt therapy is the lightheadedness does not resolve in 2 weeks. PT will continue to work with her to improve her gait safety. Recommend discharge home with assist.      Anticipated Discharge Disposition (PT): home with assist

## 2024-10-11 NOTE — PLAN OF CARE
SLP speech-language/cognitive evaluation complete. The pt was alert, cooperative, and oriented x4. Her daughter was present at bedside throughout evaluation. She was able to follow commands, answer questions, and demonstrate appropriate cognitive linguistic function throughout assessment. Her speech is noted to be clear and 100% intelligible a the conversation level. Pt's cognitive linguistic function is at baseline status and she does not warrant further skilled SLP intervention. Please re-consult if concerns arise.

## 2024-10-11 NOTE — CASE MANAGEMENT/SOCIAL WORK
Discharge Planning Assessment   [facility]     Patient Name: Nayely Malik  MRN: [id number]  Today's Date: 10/11/2024    Admit Date: 10/10/2024        Discharge Needs Assessment       Row Name 10/11/24 1101       Living Environment    People in Home spouse    Name(s) of People in Home Jose Malik    Current Living Arrangements home    Potentially Unsafe Housing Conditions none    In the past 12 months has the electric, gas, oil, or water company threatened to shut off services in your home? No    Primary Care Provided by self    Provides Primary Care For no one    Family Caregiver if Needed spouse    Family Caregiver Names Spouse - Jose Malik    Quality of Family Relationships supportive;helpful;involved    Able to Return to Prior Arrangements yes       Resource/Environmental Concerns    Resource/Environmental Concerns none    Transportation Concerns none       Transportation Needs    In the past 12 months, has lack of transportation kept you from medical appointments or from getting medications? no    In the past 12 months, has lack of transportation kept you from meetings, work, or from getting things needed for daily living? No       Food Insecurity    Within the past 12 months, you worried that your food would run out before you got the money to buy more. Never true    Within the past 12 months, the food you bought just didn't last and you didn't have money to get more. Never true       Transition Planning    Patient/Family Anticipates Transition to home with family    Patient/Family Anticipated Services at Transition none    Transportation Anticipated family or friend will provide       Discharge Needs Assessment    Readmission Within the Last 30 Days no previous admission in last 30 days    Equipment Currently Used at Home none    Concerns to be Addressed no discharge needs identified    Anticipated Changes Related to Illness none    Equipment Needed After Discharge none    Discharge  Coordination/Progress Patient resides at home with her spouse and is independent, has a PCP and RX coverage.  Patient was evaluated by therapy who recommends home at discharge.  Patient admitted as observation status.  SW following for any needs.                   Discharge Plan    No documentation.                 Continued Care and Services - Admitted Since 10/10/2024    No active coordination exists for this encounter.          Demographic Summary    No documentation.                  Functional Status    No documentation.                  Psychosocial    No documentation.                  Abuse/Neglect    No documentation.                  Legal    No documentation.                  Substance Abuse    No documentation.                  Patient Forms    No documentation.                     JUNIOR Montez

## 2024-10-11 NOTE — PLAN OF CARE
Goal Outcome Evaluation:  Plan of Care Reviewed With: patient, grandchild(urbano)           Outcome Evaluation: OT eval completed. Pt presents alert and oriented x4, sitting up in bed with granddaughter at bedside. She reports at baseline being independent with all adls and mobility, residing at home with spouse and granddaughter. Today she was able to bring self to sitting at EOB with Mod I. Donned slip on shoes independently. She was able to complete sit <> stand t/f from EOB with SBA. Amb in hallway with SBA. Pt demonstrates L homonoymous hemianopsia during formal assessment. Pt was educated and worked on light house scanning technique during mobility to increase her independence and safety during task. She was left sitting up EOB at end of session with family in room. She would benefit from skilled OT services to assist with visual deficits. Recommend home with assist and OP OT services.      Anticipated Discharge Disposition (OT): home with assist

## 2024-10-11 NOTE — THERAPY EVALUATION
Patient Name: Nayely Malik  : 1955    MRN: [id number]                              Today's Date: 10/11/2024       Admit Date: 10/10/2024    Visit Dx:     ICD-10-CM ICD-9-CM   1. Stroke-like symptoms  R29.90 781.99   2. Visual disturbances  H53.9 368.9   3. Elevated blood pressure reading  R03.0 796.2     Patient Active Problem List   Diagnosis    Stroke-like symptoms    Visual disturbance    Essential hypertension    Type 2 diabetes mellitus    Fibromuscular dysplasia of left carotid artery, possible     Past Medical History:   Diagnosis Date    Hypertension      Past Surgical History:   Procedure Laterality Date    GALLBLADDER SURGERY        General Information       Row Name 10/11/24 0720          OT Time and Intention    Document Type evaluation  ED with L sided visual changes. Dx: stroke-like symptoms.  -J     Mode of Treatment occupational therapy  -       Row Name 10/11/24 0720          General Information    Patient Profile Reviewed yes  -J     Prior Level of Function independent:;all household mobility;transfer;bed mobility;ADL's  -     Barriers to Rehab visual deficit  -J       Row Name 10/11/24 0720          Living Environment    People in Home spouse  -       Row Name 10/11/24 0720          Home Main Entrance    Number of Stairs, Main Entrance six  -J     Stair Railings, Main Entrance railings on both sides of stairs  -       Row Name 10/11/24 0720          Stairs Within Home, Primary    Number of Stairs, Within Home, Primary none  -       Row Name 10/11/24 0720          Cognition    Orientation Status (Cognition) oriented x 4  -       Row Name 10/11/24 0720          Safety Issues, Functional Mobility    Impairments Affecting Function (Mobility) visual/perceptual  -               User Key  (r) = Recorded By, (t) = Taken By, (c) = Cosigned By      Initials Name Provider Type    Lashonda Nava, TANNERR/L, CSRS Occupational Therapist                     Mobility/ADL's        [facility] Name 10/11/24 0720          Bed Mobility    Bed Mobility supine-sit  -     Supine-Sit [place] (Bed Mobility) modified independence  -     Assistive Device (Bed Mobility) head of bed elevated;bed rails  -[facility] Name 10/11/24 0720          Transfers    Transfers sit-stand transfer;stand-sit transfer  -JJ       Row Name 10/11/24 0720          Sit-Stand Transfer    Sit-Stand [place] (Transfers) standby assist  -       Row Name 10/11/24 0720          Stand-Sit Transfer    Stand-Sit [place] (Transfers) standby assist  -[facility] Name 10/11/24 0720          Functional Mobility    Functional Mobility- Ind. Level standby assist  -     Functional Mobility- Comment amb in hallway. Worked on implementing light house scanning technique.  -[facility] Name 10/11/24 0720          Activities of Daily Living    BADL Assessment/Intervention lower body dressing  -JJ       Row Name 10/11/24 0720          Lower Body Dressing Assessment/Training    [place] Level (Lower Body Dressing) don;shoes/slippers;independent  -     Position (Lower Body Dressing) edge of bed sitting  -               User Key  (r) = Recorded By, (t) = Taken By, (c) = Cosigned By      Initials Name Provider Type     Lashonda Sam, TANNERR/L, CSRS Occupational Therapist                   Obj/Interventions       [facility] Name 10/11/24 0720          Sensory Assessment (Somatosensory)    Sensory Assessment (Somatosensory) sensation intact  -[facility] 10/11/24 0720          Vision Assessment/Intervention    Visual Field Deficit homonymous hemianopsia, left  -     Vision Assessment Comment educated on light house scanning technique.  -[facility] Name 10/11/24 0720          Strength Comprehensive (MMT)    General Manual Muscle Testing (MMT) Assessment no strength deficits identified  -[facility] Name 10/11/24 0720          Motor Skills    Motor Skills coordination  -     Coordination WFL;upper  "extremity;bilateral;finger to nose;heel to pate  -       Row Name 10/11/24 0720          Balance    Balance Assessment sitting static balance;sitting dynamic balance;standing static balance;standing dynamic balance  -JJ     Static Sitting Balance independent  -JJ     Dynamic Sitting Balance independent  -JJ     Position, Sitting Balance unsupported;sitting edge of bed  -JJ     Static Standing Balance standby assist  -JJ     Dynamic Standing Balance standby assist  -J     Position/Device Used, Standing Balance unsupported  -               User Key  (r) = Recorded By, (t) = Taken By, (c) = Cosigned By      Initials Name Provider Type    Lashonda Phillip OTR/L, CSRS Occupational Therapist                   Goals/Plan       Row Name 10/11/24 0720          Problem Specific Goal 1 (OT)    Problem Specific Goal 1 (OT) Pt will independently implement visual scanning aids 100% of the time during functional tasks to improve safety and independence.  -     Time Frame (Problem Specific Goal 1, OT) long term goal (LTG);by discharge  -     Progress/Outcome (Problem Specific Goal 1, OT) new goal  -       Row Name 10/11/24 0720          Therapy Assessment/Plan (OT)    Planned Therapy Interventions (OT) cognitive/visual perception retraining;occupation/activity based interventions;BADL retraining;IADL retraining;patient/caregiver education/training  -               User Key  (r) = Recorded By, (t) = Taken By, (c) = Cosigned By      Initials Name Provider Type    Lashonda Phillip OTR/L, CSRS Occupational Therapist                   Clinical Impression       Row Name 10/11/24 0720          Pain Assessment    Pretreatment Pain Rating 0/10 - no pain  -     Posttreatment Pain Rating 0/10 - no pain  -     Pre/Posttreatment Pain Comment \"lightheadedness\"  -       Row Name 10/11/24 0720          Plan of Care Review    Plan of Care Reviewed With patient;grandchild(urbano)  -     Outcome Evaluation OT eval " completed. Pt presents alert and oriented x4, sitting up in bed with granddaughter at bedside. She reports at baseline being independent with all adls and mobility, residing at home with spouse and granddaughter. Today she was able to bring self to sitting at EOB with Mod I. Donned slip on shoes independently. She was able to complete sit <> stand t/f from EOB with SBA. Amb in hallway with SBA. Pt demonstrates L homonoymous hemianopsia during formal assessment. Pt was educated and worked on light house scanning technique during mobility to increase her independence and safety during task. She was left sitting up EOB at end of session with family in room. She would benefit from skilled OT services to assist with visual deficits. Recommend home with assist and OP OT services.  -GUSTAVO       Row Name 10/11/24 0720          Therapy Assessment/Plan (OT)    Rehab Potential (OT) good, to achieve stated therapy goals  -     Criteria for Skilled Therapeutic Interventions Met (OT) yes;meets criteria  -     Therapy Frequency (OT) 3 times/wk  -     Predicted Duration of Therapy Intervention (OT) 10 days  -       Row Name 10/11/24 0720          Therapy Plan Review/Discharge Plan (OT)    Anticipated Discharge Disposition (OT) home with assist  -       Row Name 10/11/24 0720          Positioning and Restraints    Pre-Treatment Position in bed  -     Post Treatment Position bed  -JJ     In Bed notified nsg;sitting EOB;encouraged to call for assist;call light within reach;side rails up x2;with family/caregiver  -               User Key  (r) = Recorded By, (t) = Taken By, (c) = Cosigned By      Initials Name Provider Type    Lashonda Nava, OTR/L, CSRS Occupational Therapist                   Outcome Measures       Row Name 10/11/24 0720          How much help from another is currently needed...    Putting on and taking off regular lower body clothing? 3  -JJ     Bathing (including washing, rinsing, and drying) 3   -JJ     Toileting (which includes using toilet bed pan or urinal) 3  -JJ     Putting on and taking off regular upper body clothing 4  -JJ     Taking care of personal grooming (such as brushing teeth) 4  -JJ     Eating meals 4  -     AM-PAC 6 Clicks Score (OT) 21  -J       Row Name 10/10/24 2219 10/10/24 2207       How much help from another person do you currently need...    Turning from your back to your side while in flat bed without using bedrails? 4  -LF 4  -LF    Moving from lying on back to sitting on the side of a flat bed without bedrails? 4  -LF 4  -LF    Moving to and from a bed to a chair (including a wheelchair)? 4  -LF 4  -LF    Standing up from a chair using your arms (e.g., wheelchair, bedside chair)? 4  -LF 4  -LF    Climbing 3-5 steps with a railing? 4  -LF 4  -LF    To walk in hospital room? 4  -LF 4  -LF    AM-PAC 6 Clicks Score (PT) 24  -LF 24  -LF    Highest Level of Mobility Goal 8 --> Walked 250 feet or more  -LF 8 --> Walked 250 feet or more  -LF      Row Name 10/11/24 0720          Modified Jc Scale    Pre-Stroke Modified Osborne Scale 0 - No Symptoms at all.  -     Modified Jc Scale 3 - Moderate disability.  Requiring some help, but able to walk without assistance.  -       Row Name 10/11/24 0720          Functional Assessment    Outcome Measure Options AM-PAC 6 Clicks Daily Activity (OT);Modified Jc  -               User Key  (r) = Recorded By, (t) = Taken By, (c) = Cosigned By      Initials Name Provider Type    Lashonda Nava, TANNERR/L, CSRS Occupational Therapist    LF Luh Morin, RN Registered Nurse                    Occupational Therapy Education       Title: PT OT SLP Therapies (In Progress)       Topic: Occupational Therapy (In Progress)       Point: ADL training (Done)       Description:   Instruct learner(s) on proper safety adaptation and remediation techniques during self care or transfers.   Instruct in proper use of assistive devices.                   Learning Progress Summary             Patient Acceptance, E, VU by SAMUEL at 10/11/2024 0810   Family Acceptance, E, VU by SAMUEL at 10/11/2024 0810                         Point: Home exercise program (Not Started)       Description:   Instruct learner(s) on appropriate technique for monitoring, assisting and/or progressing therapeutic exercises/activities.                  Learner Progress:  Not documented in this visit.              Point: Precautions (Done)       Description:   Instruct learner(s) on prescribed precautions during self-care and functional transfers.                  Learning Progress Summary             Patient Acceptance, E, VU by SAMUEL at 10/11/2024 0810   Family Acceptance, E, VU by SAMUEL at 10/11/2024 0810                         Point: Body mechanics (Not Started)       Description:   Instruct learner(s) on proper positioning and spine alignment during self-care, functional mobility activities and/or exercises.                  Learner Progress:  Not documented in this visit.                              User Key       Initials Effective Dates Name Provider Type Discipline     07/11/23 -  Lashonda Sam, NESTOR/L, CSRS Occupational Therapist OT                  OT Recommendation and Plan  Planned Therapy Interventions (OT): cognitive/visual perception retraining, occupation/activity based interventions, BADL retraining, IADL retraining, patient/caregiver education/training  Therapy Frequency (OT): 3 times/wk  Plan of Care Review  Plan of Care Reviewed With: patient, grandchild(urbano)  Outcome Evaluation: OT eval completed. Pt presents alert and oriented x4, sitting up in bed with granddaughter at bedside. She reports at baseline being independent with all adls and mobility, residing at home with spouse and granddaughter. Today she was able to bring self to sitting at EOB with Mod I. Donned slip on shoes independently. She was able to complete sit <> stand t/f from EOB with SBA. Amb in hallway with  SBA. Pt demonstrates L homonoymous hemianopsia during formal assessment. Pt was educated and worked on light house scanning technique during mobility to increase her independence and safety during task. She was left sitting up EOB at end of session with family in room. She would benefit from skilled OT services to assist with visual deficits. Recommend home with assist and OP OT services.     Time Calculation:         Time Calculation- OT       Row Name 10/11/24 0720             Time Calculation- OT    OT Start Time 0720  -JJ      OT Stop Time 0800  -      OT Time Calculation (min) 40 min  -                User Key  (r) = Recorded By, (t) = Taken By, (c) = Cosigned By      Initials Name Provider Type    Lashonda Nava OTR/L, CSRS Occupational Therapist                  Therapy Charges for Today       Code Description Service Date Service Provider Modifiers Qty    [id number]  OT EVAL LOW COMPLEXITY 3 10/11/2024 Lashonda Sam OTR/L, CSRS GO 1                 Lashonda Sam, OTR/L, KEES  10/11/2024

## 2024-10-11 NOTE — H&P
[facility] Medicine Services  HISTORY AND PHYSICAL    Date of Admission: 10/10/2024  Primary Care Physician: Teto Chester MD    Subjective   Primary Historian: Patient    Chief Complaint: Vision changes    Loss of Vision  Associated symptoms include nausea.     69-year-old female presents emergency department status post vision changes.  The patient notes that  around 1140 her vision started to change today.  She states she could not see good.  Everything got blurry.  She notes she did not have a double vision.  She took her Cureamed for her arthritis, about an hour prior to this occurrence, but also notes that she has taken this for many years.  She then went to see her optometrist around 215 today.  Her daughter at bedside notes that the patient had some mild confusion on traveling to the appointment.  ED physician noted that the patient had no vision changes on exam.  On my exam the patient notes bilateral left visual field disturbance.  She notes that she has to change her central focus in order to see the things in her left peripheral vision.  She has had no color change.  No spots, aura, or floaters.  She has no chest pain.  No shortness of breath.  No acute bowel or kidney changes.  She has a right sided headache that started after the administration of labetalol in the ED.  She notes that it is on the right upper eyelid.  The patient did not elevated blood pressure this morning and took her regular blood pressure medication, and then took an at extra half pill prior to coming to the ED.  The patient notes mild nausea during the episodes of vision changes.        Review of Systems   Eyes:  Positive for visual disturbance.   Gastrointestinal:  Positive for nausea.        Otherwise complete ROS reviewed and negative except as mentioned in the HPI.    Past Medical History:   Hypertension    Past Surgical History:No past surgical history on file.    Social History:   "     Family History: None    Allergies:  Allergies   Allergen Reactions    Latex Itching       Medications:  Prior to Admission medications    Medication Sig Start Date End Date Taking? Authorizing Provider   bisoprolol-hydrochlorothiazide (ZIAC) 2.5-6.25 MG per tablet Take 0.5 tablets by mouth Daily.    Provider, MD Miladis     I have utilized all available immediate resources to obtain, update, or review the patient's current medications (including all prescriptions, over-the-counter products, herbals, cannabis/cannabidiol products, and vitamin/mineral/dietary (nutritional) supplements).    Objective     Vital Signs: /66 (BP Location: Right arm, Patient Position: Lying)   Pulse 87   Temp 97.4 °F (36.3 °C) (Oral)   Resp 18   Ht 160 cm (63\")   Wt 89.1 kg (196 lb 6.4 oz)   SpO2 100%   BMI 34.79 kg/m²   Physical Exam  Vitals reviewed.   Constitutional:       Appearance: Normal appearance. She is not ill-appearing.   HENT:      Head: Normocephalic and atraumatic.      Right Ear: External ear normal.      Left Ear: External ear normal.      Nose: Nose normal.   Eyes:      General: No scleral icterus.     Extraocular Movements: Extraocular movements intact.      Conjunctiva/sclera: Conjunctivae normal.      Comments: On exam it appears that with bilateral vision the patient has right peripheral vision, but not left peripheral vision.  The left peripheral vision absents occurs in both of the eyes.   Cardiovascular:      Rate and Rhythm: Normal rate and regular rhythm.      Heart sounds: Normal heart sounds.   Pulmonary:      Effort: Pulmonary effort is normal.      Breath sounds: Normal breath sounds.   Abdominal:      General: There is no distension.      Palpations: Abdomen is soft.   Musculoskeletal:         General: No swelling or tenderness.      Cervical back: Normal range of motion and neck supple.   Skin:     General: Skin is warm and dry.   Neurological:      Mental Status: She is alert " and oriented to person, place, and time.      Cranial Nerves: No cranial nerve deficit.      Sensory: No sensory deficit.      Motor: No weakness.      Coordination: Coordination normal.   Psychiatric:         Mood and Affect: Mood normal.         Behavior: Behavior normal.          Results Reviewed:  Lab Results (last 24 hours)       Procedure Component Value Units Date/Time    High Sensitivity Troponin T 2Hr [[id number]]  (Normal) Collected: 10/10/24 1932    Specimen: Blood Updated: 10/10/24 2003     HS Troponin T 7 ng/L      Troponin T Delta 0 ng/L     Narrative:      High Sensitive Troponin T Reference Range:  <14.0 ng/L- Negative Female for AMI  <22.0 ng/L- Negative Male for AMI  >=14 - Abnormal Female indicating possible myocardial injury.  >=22 - Abnormal Male indicating possible myocardial injury.   Clinicians would have to utilize clinical acumen, EKG, Troponin, and serial changes to determine if it is an Acute Myocardial Infarction or myocardial injury due to an underlying chronic condition.         Comprehensive Metabolic Panel [[id number]]  (Abnormal) Collected: 10/10/24 1651    Specimen: Blood Updated: 10/10/24 1727     Glucose 101 mg/dL      BUN 8 mg/dL      Creatinine 0.66 mg/dL      Sodium 142 mmol/L      Potassium 3.7 mmol/L      Comment: Slight hemolysis detected by analyzer. Result may be falsely elevated.        Chloride 102 mmol/L      CO2 29.0 mmol/L      Calcium 9.7 mg/dL      Total Protein 7.8 g/dL      Albumin 4.2 g/dL      ALT (SGPT) 41 U/L      AST (SGOT) 48 U/L      Comment: Slight hemolysis detected by analyzer. Result may be falsely elevated.        Alkaline Phosphatase 89 U/L      Total Bilirubin 0.3 mg/dL      Globulin 3.6 gm/dL      A/G Ratio 1.2 g/dL      BUN/Creatinine Ratio 12.1     Anion Gap 11.0 mmol/L      eGFR 95.7 mL/min/1.73     Narrative:      GFR Normal >60  Chronic Kidney Disease <60  Kidney Failure <15      Magnesium [[id number]]  (Normal) Collected: 10/10/24 1651     Specimen: Blood Updated: 10/10/24 1727     Magnesium 2.0 mg/dL     High Sensitivity Troponin T [[id number]]  (Normal) Collected: 10/10/24 1651    Specimen: Blood Updated: 10/10/24 1721     HS Troponin T 7 ng/L     Narrative:      High Sensitive Troponin T Reference Range:  <14.0 ng/L- Negative Female for AMI  <22.0 ng/L- Negative Male for AMI  >=14 - Abnormal Female indicating possible myocardial injury.  >=22 - Abnormal Male indicating possible myocardial injury.   Clinicians would have to utilize clinical acumen, EKG, Troponin, and serial changes to determine if it is an Acute Myocardial Infarction or myocardial injury due to an underlying chronic condition.         Protime-INR [[id number]]  (Abnormal) Collected: 10/10/24 1651    Specimen: Blood Updated: 10/10/24 1711     Protime 12.5 Seconds      INR 0.90    aPTT [[id number]]  (Normal) Collected: 10/10/24 1651    Specimen: Blood Updated: 10/10/24 1711     PTT 29.0 seconds     CBC & Differential [[id number]]  (Abnormal) Collected: 10/10/24 1651    Specimen: Blood Updated: 10/10/24 1702    Narrative:      The following orders were created for panel order CBC & Differential.  Procedure                               Abnormality         Status                     ---------                               -----------         ------                     CBC Auto Differential[[id number]]        Abnormal            Final result                 Please view results for these tests on the individual orders.    CBC Auto Differential [[id number]]  (Abnormal) Collected: 10/10/24 1651    Specimen: Blood Updated: 10/10/24 1702     WBC 7.97 10*3/mm3      RBC 6.25 10*6/mm3      Hemoglobin 16.3 g/dL      Hematocrit 50.8 %      MCV 81.3 fL      MCH 26.1 pg      MCHC 32.1 g/dL      RDW 15.3 %      RDW-SD 43.6 fl      MPV 10.8 fL      Platelets 208 10*3/mm3      Neutrophil % 53.2 %      Lymphocyte % 38.9 %      Monocyte % 5.0 %      Eosinophil % 2.3 %      Basophil % 0.5 %      Immature Grans %  0.1 %      Neutrophils, Absolute 4.24 10*3/mm3      Lymphocytes, Absolute 3.10 10*3/mm3      Monocytes, Absolute 0.40 10*3/mm3      Eosinophils, Absolute 0.18 10*3/mm3      Basophils, Absolute 0.04 10*3/mm3      Immature Grans, Absolute 0.01 10*3/mm3      nRBC 0.0 /100 WBC           Imaging Results (Last 24 Hours)       Procedure Component Value Units Date/Time    CT Angiogram Head w AI Analysis of LVO [[id number]] Collected: 10/10/24 1730     Updated: 10/10/24 1738    Narrative:      EXAM: CT ANGIOGRAM HEAD W AI ANALYSIS OF LVO-, CT ANGIOGRAM NECK- -  10/10/2024 3:59 PM     HISTORY: Visual changes/strokelike symptoms       COMPARISON: None available.     DOSE LENGTH PRODUCT: 397.83 mGy.cm. Automated exposure control was also  utilized to decrease patient radiation dose.     TECHNIQUE: CTA head and neck performed with intravenous contrast. 3D  postprocessing, including MIPs, performed and images saved to PACS. AI  ANALYSIS OF LVO WAS UTILIZED.  Grading of carotid stenosis performed  with NASCET criteria.     FINDINGS:     CTA HEAD: Distal ICAs are patent and without flow-limiting stenosis. No  intracranial large vessel occlusion. Anterior and middle cerebral  arteries are patent and without flow-limiting stenosis. Intracranial  vertebral arteries and basilar artery are normal in caliber. Posterior  cerebral arteries and poterior circulation are patent and normal in  caliber. No visualized aneurysm or vascular malformation. The dural  venous sinuses are unremarkable with no filling defect. There is  calcification of the cavernous portion of the bilateral intracranial  internal carotid arteries but no significant stenosis is seen.     CTA NECK: The great vessels originate normally from the aortic arch.  There is mild atherosclerosis at the aortic arch.. Common carotid  arteries are patent and without flow-limiting stenosis. There is mild  atherosclerosis at the bilateral carotid bulbs without significant  stenosis.  There is a beaded appearance of the mid to distal left  internal carotid artery may be due to fibromuscular dysplasia. The  vertebral arteries are opacified without significant ostial narrowing or  stenosis.  No evidence of vertebral artery dissection or pseudoaneurysm.     The submitted soft tissues of the neck are unremarkable..  Lung apices are clear.       Impression:         1. No arterial occlusion or flow-limiting stenosis in the neck. Question  fibromuscular dysplasia of the mid to distal left internal carotid  artery.  2. No intracranial large vessel occlusion or flow-limiting stenosis.     This report was signed and finalized on 10/10/2024 5:35 PM by Trevon Wheeler.       CT Angiogram Neck [[id number]] Collected: 10/10/24 1730     Updated: 10/10/24 1738    Narrative:      EXAM: CT ANGIOGRAM HEAD W AI ANALYSIS OF LVO-, CT ANGIOGRAM NECK- -  10/10/2024 3:59 PM     HISTORY: Visual changes/strokelike symptoms       COMPARISON: None available.     DOSE LENGTH PRODUCT: 397.83 mGy.cm. Automated exposure control was also  utilized to decrease patient radiation dose.     TECHNIQUE: CTA head and neck performed with intravenous contrast. 3D  postprocessing, including MIPs, performed and images saved to PACS. AI  ANALYSIS OF LVO WAS UTILIZED.  Grading of carotid stenosis performed  with NASCET criteria.     FINDINGS:     CTA HEAD: Distal ICAs are patent and without flow-limiting stenosis. No  intracranial large vessel occlusion. Anterior and middle cerebral  arteries are patent and without flow-limiting stenosis. Intracranial  vertebral arteries and basilar artery are normal in caliber. Posterior  cerebral arteries and poterior circulation are patent and normal in  caliber. No visualized aneurysm or vascular malformation. The dural  venous sinuses are unremarkable with no filling defect. There is  calcification of the cavernous portion of the bilateral intracranial  internal carotid arteries but no significant  stenosis is seen.     CTA NECK: The great vessels originate normally from the aortic arch.  There is mild atherosclerosis at the aortic arch.. Common carotid  arteries are patent and without flow-limiting stenosis. There is mild  atherosclerosis at the bilateral carotid bulbs without significant  stenosis. There is a beaded appearance of the mid to distal left  internal carotid artery may be due to fibromuscular dysplasia. The  vertebral arteries are opacified without significant ostial narrowing or  stenosis.  No evidence of vertebral artery dissection or pseudoaneurysm.     The submitted soft tissues of the neck are unremarkable..  Lung apices are clear.       Impression:         1. No arterial occlusion or flow-limiting stenosis in the neck. Question  fibromuscular dysplasia of the mid to distal left internal carotid  artery.  2. No intracranial large vessel occlusion or flow-limiting stenosis.     This report was signed and finalized on 10/10/2024 5:35 PM by Trevon Wheeler.       CT Head Without Contrast [[id number]] Collected: 10/10/24 1710     Updated: 10/10/24 1717    Narrative:      EXAM: CT HEAD WO CONTRAST-      DATE: 10/10/2024 3:59 PM     HISTORY: Visual changes/strokelike symptoms       COMPARISON: None available.     DOSE LENGTH PRODUCT: 660.75 mGy.cm  Automated exposure control was also  utilized to decrease patient radiation dose.     TECHNIQUE: Unenhanced CT images obtained from vertex to skull base with  multiplanar reformats.     FINDINGS:  There is no acute intracranial hemorrhage, midline shift, mass effect,  or hydrocephalus. There is no CT evidence for acute infarct.     There are chronic changes with volume loss and chronic small vessel  ischemic change of the periventricular white matter.      SOFT TISSUES: The scalp soft tissues are unremarkable.        SINUS:The visualized paranasal sinuses and mastoid air cells are clear.      ORBITS: The visualized orbits and globes are unremarkable.           Impression:      1. Chronic changes and no acute intracranial findings.      This report was signed and finalized on 10/10/2024 5:14 PM by Trevon Wheeler.       XR Chest 1 View [[id number]] Collected: 10/10/24 1709     Updated: 10/10/24 1713    Narrative:      EXAMINATION: XR CHEST 1 VW- 10/10/2024 5:09 PM     HISTORY: Stroke evaluation.     COMPARISON: Chest x-ray 10/1/2007.     REPORT:  A frontal view of the chest was obtained. The lungs are clear and mildly  hypoexpanded, with mild central vascular crowding. The cardiac and  mediastinal silhouettes are within normal limits. The visualized bony  thorax and upper abdomen are unremarkable.                                                                                                                                                       Impression:             Somewhat shallow inspiration, no acute cardiopulmonary abnormality.     This report was signed and finalized on 10/10/2024 5:10 PM by Dr. Robby Funes MD.             I have personally reviewed and interpreted the radiology studies and ECG obtained at time of admission.     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  Cardiac echo in the morning  Neurology consult  As needed Zofran    The patient will be admitted to my service here at [facility].  Primary team to take over in the morning    Medical Decision Making  Number and Complexity of problems: 4, complex  Differential Diagnosis: Retinal detachment    Conditions and Status        Condition is unchanged.     [facility] Data  External documents reviewed: None  Cardiac tracing (EKG, telemetry) interpretation: None  Radiology interpretation: None  Labs reviewed: Reviewed  Any tests that were considered  but not ordered: None     Decision rules/scores evaluated (example [id number], Wells, etc): None     Discussed with: Patient and family at bedside     Care Planning  Shared decision making: Patient, family, and ED staff  Code status and discussions: Full    Disposition  Social Determinants of Health that impact treatment or disposition: None  Estimated length of stay is overnight.     I confirmed that the patient's advanced care plan is present, code status is documented, and a surrogate decision maker is listed in the patient's medical record.     The patient's surrogate decision maker is family.     The patient was seen and examined by me on 10/10/2024 at 9.    Electronically signed by Jennifer Mclaughlin DO, 10/10/24, 21:19 CDT.

## 2024-10-11 NOTE — PROGRESS NOTES
Nutrition Services    Patient Name:  Nayely Malik  YOB: 1955  MRN: [id number]  Admit Date:  10/10/2024    Pt newly diagnosed diabetic with HgbA1c of 7.0%. Provided pt with diabetic education with recommended portion sizes,sources of carbohydrates and estimated amount of carbohydrates pt should have with meals. Pt reports she usually eats two meals a day. Encouraged pt to consume consistent meals; avoid skipping meals. Pt's daughter at bedside for education as well. RDN provided written material and explaination of diabetic diet. RDN contact information provided for any additional questions or concerns.       Electronically signed by:  Telma Garcia RDN, TRUPTI  10/11/24 15:28 CDT

## 2024-10-11 NOTE — PROGRESS NOTES
Patient Name: Nayely Malik  Date of Admission: 10/10/2024  Today's Date: 10/11/24  Length of Stay: 0  Primary Care Physician: Teto Chester MD    Subjective   Chief Complaint: Visual changes  HPI   Nayely Malik is a 69-year-old female with a past medical history of pretension and arthritis.  Presented to [facility] emergency department on 10/10/2024 with complaints of visual changes.  She states that she was having difficulty since with her vision for the past 24 hours, including blurriness and loss of peripheral vision, no complaints of double vision.  She stated that she presented to her optometrist at 1415 and after thorough examination he requested patient report to the emergency department as there were no visual changes on exam.  Upon assessment patient notes bilateral left visual field disturbance, patient is able to see centralized vision and peripheral right vision however has no left peripheral field.  She has no complaints of spots, aura or floaters.  She additionally reports mild nausea however no additional complaints of left-sided weakness.  There is no facial droop, aphasia, or of coordination.  Patient's daughter does report that she seemed mildly confused on her hide to the optometrist however patient is alert and oriented during exam.  Patient does report that she had some elevated blood pressure this morning, took her normal blood pressure medication and when it did not come down she took an extra half pill prior to coming to the ER.  Patient presented with a blood pressure of 212/88 during workup ED physician gave patient 20 mg of labetalol and 20 mg of hydralazine.  Which  brought the patient's blood pressure to 112/57.  Blood pressure medication was placed on hold by Dr. Choi after admission, will attempt to leave off until blood pressure increases due to possibility of CVA.     CT angio of the head/neck revealed no arterial looks occlusion or flow-limiting stenosis in the neck.   Questionable fibromuscular dysplasia of the mid to distal left internal carotid artery.  No intracranial large vessel occlusion or flow-limiting stenosis.      Today: Patient is resting comfortably in bed on room air with her daughter at bedside.  Patient is alert and oriented and able to participate in assessment and history.  Assessment is essentially the same, patient does continue to have mild nausea, left peripheral vision remains lost in both eyes.  No new complaints of confusion, dizziness, aphasia, and no weakness bilaterally.  Patient was made aware that neurology consultation will be performed today including MRI of the brain.  Physical and Occupational Therapy will evaluate the patient with recommendations.  Additionally patient was made aware of diabetes diagnosis and diabetic educator will hopefully be seeing the patient today with additional education.  All patient and daughter's questions were answered to the best my ability and they are in agreement for continued plan of care.    Documented weights    10/10/24 1616 10/10/24 2100   Weight: 91.6 kg (202 lb) 89.1 kg (196 lb 6.4 oz)           Review of Systems   Constitutional:  Positive for fatigue.   Eyes:  Positive for visual disturbance.   Gastrointestinal:  Positive for nausea.      All pertinent negatives and positives are as above. All other systems have been reviewed and are negative unless otherwise stated.     Objective    Temp:  [97.4 °F (36.3 °C)-98 °F (36.7 °C)] 98 °F (36.7 °C)  Heart Rate:  [77-92] 90  Resp:  [14-20] 16  BP: (112-212)/(57-92) 124/69  Physical Exam  Vitals and nursing note reviewed.   Constitutional:       Appearance: Normal appearance.   HENT:      Head: Normocephalic and atraumatic.      Right Ear: Tympanic membrane normal.      Left Ear: Tympanic membrane normal.      Nose: Nose normal.      Mouth/Throat:      Mouth: Mucous membranes are moist.      Pharynx: Oropharynx is clear.   Eyes:      General: Lids are normal.  Visual field deficit present.      Extraocular Movements: Extraocular movements intact.      Comments: Loss of bilateral left Peripheral vision   Cardiovascular:      Rate and Rhythm: Normal rate and regular rhythm.      Pulses: Normal pulses.      Heart sounds: Normal heart sounds.   Pulmonary:      Effort: Pulmonary effort is normal.      Breath sounds: Normal breath sounds.   Abdominal:      General: Abdomen is flat. Bowel sounds are normal.      Palpations: Abdomen is soft.   Genitourinary:     Comments: Voiding per bathroom privileges  Musculoskeletal:         General: Normal range of motion.      Cervical back: Normal range of motion and neck supple. No rigidity or tenderness.   Skin:     General: Skin is warm and dry.      Capillary Refill: Capillary refill takes less than 2 seconds.   Neurological:      General: No focal deficit present.      Mental Status: She is alert and oriented to person, place, and time.   Psychiatric:         Mood and Affect: Mood normal.         Behavior: Behavior normal.         Thought Content: Thought content normal.         Judgment: Judgment normal.       Results Review:  I have reviewed the labs, radiology results, and diagnostic studies.    Laboratory Data:   Results from last 7 days   Lab Units 10/10/24  1651   WBC 10*3/mm3 7.97   HEMOGLOBIN g/dL 16.3*   HEMATOCRIT % 50.8*   PLATELETS 10*3/mm3 208        Results from last 7 days   Lab Units 10/10/24  1651   SODIUM mmol/L 142   POTASSIUM mmol/L 3.7   CHLORIDE mmol/L 102   CO2 mmol/L 29.0   BUN mg/dL 8   CREATININE mg/dL 0.66   CALCIUM mg/dL 9.7   BILIRUBIN mg/dL 0.3   ALK PHOS U/L 89   ALT (SGPT) U/L 41*   AST (SGOT) U/L 48*   GLUCOSE mg/dL 101*       Culture Data:     Microbiology Results (last 10 days)       ** No results found for the last 240 hours. **             Radiology Data:   Imaging Results (Last 7 Days)       Procedure Component Value Units Date/Time    CT Angiogram Head w AI Analysis of LVO [[id number]] Collected:  10/10/24 1730     Updated: 10/10/24 1738    Narrative:      EXAM: CT ANGIOGRAM HEAD W AI ANALYSIS OF LVO-, CT ANGIOGRAM NECK- -  10/10/2024 3:59 PM     HISTORY: Visual changes/strokelike symptoms       COMPARISON: None available.     DOSE LENGTH PRODUCT: 397.83 mGy.cm. Automated exposure control was also  utilized to decrease patient radiation dose.     TECHNIQUE: CTA head and neck performed with intravenous contrast. 3D  postprocessing, including MIPs, performed and images saved to PACS. AI  ANALYSIS OF LVO WAS UTILIZED.  Grading of carotid stenosis performed  with NASCET criteria.     FINDINGS:     CTA HEAD: Distal ICAs are patent and without flow-limiting stenosis. No  intracranial large vessel occlusion. Anterior and middle cerebral  arteries are patent and without flow-limiting stenosis. Intracranial  vertebral arteries and basilar artery are normal in caliber. Posterior  cerebral arteries and poterior circulation are patent and normal in  caliber. No visualized aneurysm or vascular malformation. The dural  venous sinuses are unremarkable with no filling defect. There is  calcification of the cavernous portion of the bilateral intracranial  internal carotid arteries but no significant stenosis is seen.     CTA NECK: The great vessels originate normally from the aortic arch.  There is mild atherosclerosis at the aortic arch.. Common carotid  arteries are patent and without flow-limiting stenosis. There is mild  atherosclerosis at the bilateral carotid bulbs without significant  stenosis. There is a beaded appearance of the mid to distal left  internal carotid artery may be due to fibromuscular dysplasia. The  vertebral arteries are opacified without significant ostial narrowing or  stenosis.  No evidence of vertebral artery dissection or pseudoaneurysm.     The submitted soft tissues of the neck are unremarkable..  Lung apices are clear.       Impression:         1. No arterial occlusion or flow-limiting  stenosis in the neck. Question  fibromuscular dysplasia of the mid to distal left internal carotid  artery.  2. No intracranial large vessel occlusion or flow-limiting stenosis.     This report was signed and finalized on 10/10/2024 5:35 PM by Trevon Wheeler.       CT Angiogram Neck [[id number]] Collected: 10/10/24 1730     Updated: 10/10/24 1738    Narrative:      EXAM: CT ANGIOGRAM HEAD W AI ANALYSIS OF LVO-, CT ANGIOGRAM NECK- -  10/10/2024 3:59 PM     HISTORY: Visual changes/strokelike symptoms       COMPARISON: None available.     DOSE LENGTH PRODUCT: 397.83 mGy.cm. Automated exposure control was also  utilized to decrease patient radiation dose.     TECHNIQUE: CTA head and neck performed with intravenous contrast. 3D  postprocessing, including MIPs, performed and images saved to PACS. AI  ANALYSIS OF LVO WAS UTILIZED.  Grading of carotid stenosis performed  with NASCET criteria.     FINDINGS:     CTA HEAD: Distal ICAs are patent and without flow-limiting stenosis. No  intracranial large vessel occlusion. Anterior and middle cerebral  arteries are patent and without flow-limiting stenosis. Intracranial  vertebral arteries and basilar artery are normal in caliber. Posterior  cerebral arteries and poterior circulation are patent and normal in  caliber. No visualized aneurysm or vascular malformation. The dural  venous sinuses are unremarkable with no filling defect. There is  calcification of the cavernous portion of the bilateral intracranial  internal carotid arteries but no significant stenosis is seen.     CTA NECK: The great vessels originate normally from the aortic arch.  There is mild atherosclerosis at the aortic arch.. Common carotid  arteries are patent and without flow-limiting stenosis. There is mild  atherosclerosis at the bilateral carotid bulbs without significant  stenosis. There is a beaded appearance of the mid to distal left  internal carotid artery may be due to fibromuscular dysplasia.  The  vertebral arteries are opacified without significant ostial narrowing or  stenosis.  No evidence of vertebral artery dissection or pseudoaneurysm.     The submitted soft tissues of the neck are unremarkable..  Lung apices are clear.       Impression:         1. No arterial occlusion or flow-limiting stenosis in the neck. Question  fibromuscular dysplasia of the mid to distal left internal carotid  artery.  2. No intracranial large vessel occlusion or flow-limiting stenosis.     This report was signed and finalized on 10/10/2024 5:35 PM by Trevon Wheeler.       CT Head Without Contrast [[id number]] Collected: 10/10/24 1710     Updated: 10/10/24 1717    Narrative:      EXAM: CT HEAD WO CONTRAST-      DATE: 10/10/2024 3:59 PM     HISTORY: Visual changes/strokelike symptoms       COMPARISON: None available.     DOSE LENGTH PRODUCT: 660.75 mGy.cm  Automated exposure control was also  utilized to decrease patient radiation dose.     TECHNIQUE: Unenhanced CT images obtained from vertex to skull base with  multiplanar reformats.     FINDINGS:  There is no acute intracranial hemorrhage, midline shift, mass effect,  or hydrocephalus. There is no CT evidence for acute infarct.     There are chronic changes with volume loss and chronic small vessel  ischemic change of the periventricular white matter.      SOFT TISSUES: The scalp soft tissues are unremarkable.        SINUS:The visualized paranasal sinuses and mastoid air cells are clear.      ORBITS: The visualized orbits and globes are unremarkable.          Impression:      1. Chronic changes and no acute intracranial findings.      This report was signed and finalized on 10/10/2024 5:14 PM by Trevon Wheeler.       XR Chest 1 View [[id number]] Collected: 10/10/24 1709     Updated: 10/10/24 1713    Narrative:      EXAMINATION: XR CHEST 1 VW- 10/10/2024 5:09 PM     HISTORY: Stroke evaluation.     COMPARISON: Chest x-ray 10/1/2007.     REPORT:  A frontal view of the  chest was obtained. The lungs are clear and mildly  hypoexpanded, with mild central vascular crowding. The cardiac and  mediastinal silhouettes are within normal limits. The visualized bony  thorax and upper abdomen are unremarkable.                                                                                                                                                       Impression:             Somewhat shallow inspiration, no acute cardiopulmonary abnormality.     This report was signed and finalized on 10/10/2024 5:10 PM by Dr. Robby Funes MD.                I have reviewed the patient's current medications.     atorvastatin, 80 mg, Oral, Nightly  [Held by provider] bisoprolol, 2.5 mg, Oral, Daily   And  [Held by provider] hydroCHLOROthiazide, 6.25 mg, Oral, Daily  insulin lispro, 2-7 Units, Subcutaneous, 4x Daily AC & at Bedtime  sodium chloride, 10 mL, Intravenous, Q12H       No intake or output data in the 24 hours ending 10/11/24 0834     Assessment/Plan   Assessment  Active Hospital Problems    Diagnosis     **Stroke-like symptoms     Visual disturbance     Essential hypertension     Type 2 diabetes mellitus     Fibromuscular dysplasia of left carotid artery, possible        Treatment Plan  1.  Strokelike symptoms/visual disturbance-patient presented with visual disturbance, including loss of bilateral left peripheral vision.  Evaluated by optometrist with normal optic exam.  Neurology consultation, MRI of the brain, continue stroke protocol.  PT/OT evaluations pending.    Initiate ASA 81 mg and ASCVD risk of 22, initiated high intensity statin, Lipitor 80 mg    2.  Essential hypertension-patient's BP was initially elevated and was treated per ED.  Per stroke protocol patient's BP needs to remain elevated, bisoprolol/hydrochlorothiazide will remain on hold.  Every 4 vital signs.    3.  Type 2 diabetes mellitus-new diagnosis A1c of 7, diabetic education, patient will remain on SSI with low-dose  coverage until discharge when p.o. will be initiated.    4.  Fibromuscular dysplasia of the left carotid artery-seen per CTA of the neck.  Patient may require outpatient vascular consultation versus inpatient.  Will assess after neurology consultation.    VTE prophylaxis with SCDs  Labs in a.m.    Medical Decision Making  Stroke-like symptoms, acute, high complexity, unchanged  Visual disturbance, acute, high complexity, unchanged  hypertension, chronic, moderate complexity, unchanged  Type 2 diabetes mellitus, acute on chronic, moderate complexity, unchanged  Fibromuscular dysplasia, acute on chronic, moderate complexity, unchanged    Number and Complexity of problems: 5  Differential Diagnosis: Press syndrome    Conditions and Status        Condition is unchanged.     MDM Data  External documents reviewed: None  Cardiac tracing (EKG, telemetry) interpretation: 10/10/2024 EKG per cardiology reviewed  Radiology interpretation: 10/10/2024 chest x-ray, CTA of the head and neck per radiology reviewed  Labs reviewed: 10/10/2024 CBC, CMP, PT/INR, magnesium, A1c, lipid panel.  Any tests that were considered but not ordered: MRI of the neck to evaluate FMD, latest literature suggest CTA is the most comprehensive.  Decision rules/scores evaluated (example [id number], Wells, etc): ASCVD risk is 22%, recommendations for high intensity statin.  Initiated Lipitor 80 mg.  Discussed with: Dr. Grissom and patient     Care Planning  Shared decision making: Dr. Grissom and patient  Code status and discussions: Full code per patient  Surrogate Decision Maker her  Jose George  Social Determinants of Health that impact treatment or disposition: None determined at this time  I expect the patient to be discharged to home in 1-2 days.     Electronically signed by HARSHAL Pettit, 10/11/24, 08:34 CDT.

## 2024-10-11 NOTE — DISCHARGE SUMMARY
[facility]  DISCHARGE SUMMARY       Date of Admission: 10/10/2024  Date of Discharge:  10/11/2024  Primary Care Physician: Teto Chester MD    Presenting Problem/History of Present Illness:  Stroke like symptoms    Final Discharge Diagnoses:  Active Hospital Problems    Diagnosis     **Acute cortical and subcortical infarct in the right occipital lobe     Visual disturbance     Essential hypertension     Type 2 diabetes mellitus     Fibromuscular dysplasia of left carotid artery, possible        Consults: Neurology-Dr. Hernandez    Procedures Performed: None    Pertinent Test Results:   Results for orders placed during the hospital encounter of 10/10/24    Adult Transthoracic Echo Complete W/ Cont if Necessary Per Protocol (With Agitated Saline)    Interpretation Summary    Left ventricular systolic function is normal. Left ventricular ejection fraction appears to be 61 - 65%.    Normal right ventricular cavity size and systolic function noted.    Saline test results are negative for right to left atrial level shunt.    The aortic valve exhibits sclerosis. No aortic valve regurgitation is present. Mild aortic valve stenosis is present.    Mild mitral annular calcification is present. Trace mitral valve regurgitation is present    No evidence of pulmonary hypertension is present.      Imaging Results (All)       Procedure Component Value Units Date/Time    MRI Brain Without Contrast [[id number]] Collected: 10/11/24 1247     Updated: 10/11/24 1254    Narrative:      EXAMINATION:  MRI BRAIN WO CONTRAST-  10/11/2024 11:00 AM     HISTORY: Vision loss; R29.90-Unspecified symptoms and signs involving  the nervous system; H53.9-Unspecified visual disturbance; R03.0-Elevated  blood-pressure reading, without diagnosis of hypertension; Z74.09-Other  reduced mobility.     TECHNIQUE: Multiplanar imaging was performed in a high field magnet.     COMPARISON: No comparison  study.     FINDINGS: There is an acute cortical and subcortical infarct involving  the right occipital lobe. There is diffusion restriction on the ADC map  images. There are scattered areas of T2 high signal within the  hemispheric white matter. There is mild atrophy. There is minimal  prominence of the lateral ventricles. There is a cavum septum pellucida.  There is a partially empty appearance of the sella turcica. There is  some fluid in the optic nerve sheaths.          Impression:      1. Acute cortical and subcortical infarct in the right occipital lobe.  2. Mild atrophy with associated ventricular prominence. Cavum septum  pellucidum.  3. Scattered areas of T2 high signal within the hemispheric white matter  are nonspecific and likely due to chronic small vessel disease.        Results were called to Dr. Grissom.     This report was signed and finalized on 10/11/2024 12:51 PM by Dr. Larry Flannery MD.       CT Angiogram Head w AI Analysis of LVO [[id number]] Collected: 10/10/24 1730     Updated: 10/10/24 1738    Narrative:      EXAM: CT ANGIOGRAM HEAD W AI ANALYSIS OF LVO-, CT ANGIOGRAM NECK- -  10/10/2024 3:59 PM     HISTORY: Visual changes/strokelike symptoms       COMPARISON: None available.     DOSE LENGTH PRODUCT: 397.83 mGy.cm. Automated exposure control was also  utilized to decrease patient radiation dose.     TECHNIQUE: CTA head and neck performed with intravenous contrast. 3D  postprocessing, including MIPs, performed and images saved to PACS. AI  ANALYSIS OF LVO WAS UTILIZED.  Grading of carotid stenosis performed  with NASCET criteria.     FINDINGS:     CTA HEAD: Distal ICAs are patent and without flow-limiting stenosis. No  intracranial large vessel occlusion. Anterior and middle cerebral  arteries are patent and without flow-limiting stenosis. Intracranial  vertebral arteries and basilar artery are normal in caliber. Posterior  cerebral arteries and poterior circulation are patent and normal  in  caliber. No visualized aneurysm or vascular malformation. The dural  venous sinuses are unremarkable with no filling defect. There is  calcification of the cavernous portion of the bilateral intracranial  internal carotid arteries but no significant stenosis is seen.     CTA NECK: The great vessels originate normally from the aortic arch.  There is mild atherosclerosis at the aortic arch.. Common carotid  arteries are patent and without flow-limiting stenosis. There is mild  atherosclerosis at the bilateral carotid bulbs without significant  stenosis. There is a beaded appearance of the mid to distal left  internal carotid artery may be due to fibromuscular dysplasia. The  vertebral arteries are opacified without significant ostial narrowing or  stenosis.  No evidence of vertebral artery dissection or pseudoaneurysm.     The submitted soft tissues of the neck are unremarkable..  Lung apices are clear.       Impression:         1. No arterial occlusion or flow-limiting stenosis in the neck. Question  fibromuscular dysplasia of the mid to distal left internal carotid  artery.  2. No intracranial large vessel occlusion or flow-limiting stenosis.     This report was signed and finalized on 10/10/2024 5:35 PM by Trevon Wheeler.       CT Angiogram Neck [[id number]] Collected: 10/10/24 1730     Updated: 10/10/24 1738    Narrative:      EXAM: CT ANGIOGRAM HEAD W AI ANALYSIS OF LVO-, CT ANGIOGRAM NECK- -  10/10/2024 3:59 PM     HISTORY: Visual changes/strokelike symptoms       COMPARISON: None available.     DOSE LENGTH PRODUCT: 397.83 mGy.cm. Automated exposure control was also  utilized to decrease patient radiation dose.     TECHNIQUE: CTA head and neck performed with intravenous contrast. 3D  postprocessing, including MIPs, performed and images saved to PACS. AI  ANALYSIS OF LVO WAS UTILIZED.  Grading of carotid stenosis performed  with NASCET criteria.     FINDINGS:     CTA HEAD: Distal ICAs are patent and without  flow-limiting stenosis. No  intracranial large vessel occlusion. Anterior and middle cerebral  arteries are patent and without flow-limiting stenosis. Intracranial  vertebral arteries and basilar artery are normal in caliber. Posterior  cerebral arteries and poterior circulation are patent and normal in  caliber. No visualized aneurysm or vascular malformation. The dural  venous sinuses are unremarkable with no filling defect. There is  calcification of the cavernous portion of the bilateral intracranial  internal carotid arteries but no significant stenosis is seen.     CTA NECK: The great vessels originate normally from the aortic arch.  There is mild atherosclerosis at the aortic arch.. Common carotid  arteries are patent and without flow-limiting stenosis. There is mild  atherosclerosis at the bilateral carotid bulbs without significant  stenosis. There is a beaded appearance of the mid to distal left  internal carotid artery may be due to fibromuscular dysplasia. The  vertebral arteries are opacified without significant ostial narrowing or  stenosis.  No evidence of vertebral artery dissection or pseudoaneurysm.     The submitted soft tissues of the neck are unremarkable..  Lung apices are clear.       Impression:         1. No arterial occlusion or flow-limiting stenosis in the neck. Question  fibromuscular dysplasia of the mid to distal left internal carotid  artery.  2. No intracranial large vessel occlusion or flow-limiting stenosis.     This report was signed and finalized on 10/10/2024 5:35 PM by Trevon Wheeler.       CT Head Without Contrast [[id number]] Collected: 10/10/24 1710     Updated: 10/10/24 1717    Narrative:      EXAM: CT HEAD WO CONTRAST-      DATE: 10/10/2024 3:59 PM     HISTORY: Visual changes/strokelike symptoms       COMPARISON: None available.     DOSE LENGTH PRODUCT: 660.75 mGy.cm  Automated exposure control was also  utilized to decrease patient radiation dose.     TECHNIQUE:  Unenhanced CT images obtained from vertex to skull base with  multiplanar reformats.     FINDINGS:  There is no acute intracranial hemorrhage, midline shift, mass effect,  or hydrocephalus. There is no CT evidence for acute infarct.     There are chronic changes with volume loss and chronic small vessel  ischemic change of the periventricular white matter.      SOFT TISSUES: The scalp soft tissues are unremarkable.        SINUS:The visualized paranasal sinuses and mastoid air cells are clear.      ORBITS: The visualized orbits and globes are unremarkable.          Impression:      1. Chronic changes and no acute intracranial findings.      This report was signed and finalized on 10/10/2024 5:14 PM by Trevon Wheeler.       XR Chest 1 View [[id number]] Collected: 10/10/24 1709     Updated: 10/10/24 1713    Narrative:      EXAMINATION: XR CHEST 1 VW- 10/10/2024 5:09 PM     HISTORY: Stroke evaluation.     COMPARISON: Chest x-ray 10/1/2007.     REPORT:  A frontal view of the chest was obtained. The lungs are clear and mildly  hypoexpanded, with mild central vascular crowding. The cardiac and  mediastinal silhouettes are within normal limits. The visualized bony  thorax and upper abdomen are unremarkable.                                                                                                                                                       Impression:             Somewhat shallow inspiration, no acute cardiopulmonary abnormality.     This report was signed and finalized on 10/10/2024 5:10 PM by Dr. Robby Funes MD.             LAB RESULTS:      Lab 10/10/24  1651   WBC 7.97   HEMOGLOBIN 16.3*   HEMATOCRIT 50.8*   PLATELETS 208   NEUTROS ABS 4.24   IMMATURE GRANS (ABS) 0.01   LYMPHS ABS 3.10   MONOS ABS 0.40   EOS ABS 0.18   MCV 81.3   PROTIME 12.5   APTT 29.0         Lab 10/11/24  0533 10/10/24  1651   SODIUM  --  142   POTASSIUM  --  3.7   CHLORIDE  --  102   CO2  --  29.0   ANION GAP  --  11.0   BUN   --  8   CREATININE  --  0.66   EGFR  --  95.7   GLUCOSE  --  101*   CALCIUM  --  9.7   MAGNESIUM  --  2.0   HEMOGLOBIN A1C 7.00*  --          Lab 10/10/24  1651   TOTAL PROTEIN 7.8   ALBUMIN 4.2   GLOBULIN 3.6   ALT (SGPT) 41*   AST (SGOT) 48*   BILIRUBIN 0.3   ALK PHOS 89         Lab 10/10/24  1932 10/10/24  1651   HSTROP T 7 7   PROTIME  --  12.5   INR  --  0.90*         Lab 10/11/24  0533   CHOLESTEROL 207*   LDL CHOL 131*   HDL CHOL 59   TRIGLYCERIDES 93             Brief Urine Lab Results       None          Microbiology Results (last 10 days)       ** No results found for the last 240 hours. **          Microbiology Results (last 10 days)       ** No results found for the last 240 hours. **             Documented weights    10/10/24 1616 10/10/24 2100   Weight: 91.6 kg (202 lb) 89.1 kg (196 lb 6.4 oz)        Hospital Course: Nayely Malik is a 69-year-old female with a past medical history of pretension and arthritis.  Presented to [facility] emergency department on 10/10/2024 with complaints of visual changes.  She states that she was having difficulty since with her vision for the past 24 hours, including blurriness and loss of peripheral vision, no complaints of double vision.  She stated that she presented to her optometrist at 1415 and after thorough examination he requested patient report to the emergency department as there were no visual changes on exam.  Upon assessment patient notes bilateral left visual field disturbance, patient is able to see centralized vision and peripheral right vision however has no left peripheral field.  She has no complaints of spots, aura or floaters.  She additionally reports mild nausea however no additional complaints of left-sided weakness.  There is no facial droop, aphasia, or of coordination.  Patient's daughter does report that she seemed mildly confused on her hide to the optometrist however patient is alert and oriented during exam.  Patient does report that she  had some elevated blood pressure this morning, took her normal blood pressure medication and when it did not come down she took an extra half pill prior to coming to the ER.  Patient presented with a blood pressure of 212/88 during workup ED physician gave patient 20 mg of labetalol and 20 mg of hydralazine.  Which  brought the patient's blood pressure to 112/57.  Blood pressure medication was placed on hold by Dr. Choi after admission, will attempt to leave off until blood pressure increases due to possibility of CVA.      CT angio of the head/neck revealed no arterial looks occlusion or flow-limiting stenosis in the neck.  Questionable fibromuscular dysplasia of the mid to distal left internal carotid artery.  No intracranial large vessel occlusion or flow-limiting stenosis.    Patient presented with visual disturbance, including loss of bilateral left peripheral vision.  Evaluated by optometrist with normal optic exam.  Consultation performed today per Dr. Hernandez, with MRI results of acute right occipital stroke.  Recommendations for ASA 81 mg, high-dose statin, patient has been intolerant to Lipitor, will initiate high-dose Crestor at 40 mg.  They also reviewed CTA of the neck for possible FMD.  Renal ultrasound ordered but patient had eaten and is adamant to be discharged today, they requested this be done outpatient with follow-up with vascular surgery.    Hypertension, patient was instructed to continue to hold Ziac until Sunday, she was additionally told if systolic blood pressure was greater than 160 to take a half dose, if below to hold until Monday when she can be evaluated by PCP for alterations to blood pressure regimen.    Type 2 diabetes mellitus, new diagnosis A1c of 7, patient remained on low-dose sliding scale insulin during hospitalization.  Placed on Amaryl 1 mg p.o., patient was instructed to keep a blood glucose diary for 1 week to evaluate with PCP.  Diabetic educator is on vacation this  "week, patient was given an Accu-Chek machine with instructions and extensive review per dietitian.    Fibromuscular dysplasia of the left carotid artery, seen by CTA of the neck.  Renal ultrasound ordered for outpatient follow-up with Dr. Dukes, vascular surgery.    Today patient is resting in bed, extensive discussion with patient and her granddaughter regarding diagnoses, plan of care, and follow-up.  All patient and granddaughter's questions were answered to the best my ability and they are both in agreement for discharge home today.    Patient has reached the maximum benefit of hospitalization and is stable for discharge  Patient has been evaluated today 10/11/24 and is stable for discharge.     Physical Exam on Discharge:  /77 (BP Location: Left arm, Patient Position: Lying)   Pulse 84   Temp 97.9 °F (36.6 °C) (Oral)   Resp 16   Ht 160 cm (63\")   Wt 89.1 kg (196 lb 6.4 oz)   SpO2 97%   BMI 34.79 kg/m²   Physical Exam  Vitals and nursing note reviewed.   Constitutional:       Appearance: Normal appearance.   HENT:      Head: Normocephalic and atraumatic.      Right Ear: Tympanic membrane normal.      Left Ear: Tympanic membrane normal.      Nose: Nose normal.      Mouth/Throat:      Mouth: Mucous membranes are moist.      Pharynx: Oropharynx is clear.   Eyes:      General: Lids are normal. Visual field deficit present.      Extraocular Movements: Extraocular movements intact.      Comments: Loss of bilateral left Peripheral vision   Cardiovascular:      Rate and Rhythm: Normal rate and regular rhythm.      Pulses: Normal pulses.      Heart sounds: Normal heart sounds.   Pulmonary:      Effort: Pulmonary effort is normal.      Breath sounds: Normal breath sounds.   Abdominal:      General: Abdomen is flat. Bowel sounds are normal.      Palpations: Abdomen is soft.   Genitourinary:     Comments: Voiding per bathroom privileges  Musculoskeletal:         General: Normal range of motion.      Cervical " back: Normal range of motion and neck supple. No rigidity or tenderness.   Skin:     General: Skin is warm and dry.      Capillary Refill: Capillary refill takes less than 2 seconds.   Neurological:      General: No focal deficit present.      Mental Status: She is alert and oriented to person, place, and time.   Psychiatric:         Mood and Affect: Mood normal.         Behavior: Behavior normal.         Thought Content: Thought content normal.         Judgment: Judgment normal.     Condition on Discharge: Stable for discharge home    Discharge Disposition:  Home or Self Care    Discharge Medications:     Discharge Medications        New Medications        Instructions Start Date   aspirin 81 MG EC tablet  Notes to patient: NEXT DOSE DUE IN AM 10-12   81 mg, Oral, Daily   Start Date: October 12, 2024     glimepiride 1 MG tablet  Commonly known as: Amaryl  Notes to patient: NEXT DOSE DUE IN AM 10-12   1 mg, Oral, Every Morning Before Breakfast      rosuvastatin 40 MG tablet  Commonly known as: Crestor  Notes to patient: NEXT DOSE DUE IN AM 10-12   40 mg, Oral, Daily             Stop These Medications      bisoprolol-hydrochlorothiazide 2.5-6.25 MG per tablet  Commonly known as: ZIAC              Discharge Diet:   Diet Instructions       Diet: Cardiac Diets, Diabetic Diets; Healthy Heart (2-3 Na+); Regular (IDDSI 7); Thin (IDDSI 0); Consistent Carbohydrate      Discharge Diet:  Cardiac Diets  Diabetic Diets       Cardiac Diet: Healthy Heart (2-3 Na+)    Texture: Regular (IDDSI 7)    Fluid Consistency: Thin (IDDSI 0)    Diabetic Diet: Consistent Carbohydrate            Activity at Discharge:   Activity Instructions       Activity as Tolerated      Driving Restrictions      Type of Restriction: Driving    Driving Restrictions: No Driving    Until cleared by neurology.            Discharge Instructions:   1.  Patient was instructed to return for medical attention for any new or worsening visual changes, weakness, chest  pain, shortness of breath or consciousness.  2.  Patient was instructed with PCP in 1 week.  3.  Patient was instructed to follow-up with [facility] neurology in 6 weeks.  4.  Patient was instructed to keep a blood glucose diary for approximately 1 week prior to PCP appointment for evaluation.  5.  Was instructed to hold blood pressure medication until Sunday, if blood pressure is greater than 160 to take half tablet and report for PCP appointment on Monday.  6.  Patient was given verbal and written instructions on all new medications and diabetic regimens.  7.  Patient was instructed to report for renal ultrasound outpatient and follow-up with Dr. Dukes, vascular surgery for evaluation.    Follow-up Appointments:   Future Appointments   Date Time Provider Department Center   10/12/2024  8:00 AM PAD US 1 BH PAD US PAD   2/5/2025 10:30 AM Lorne Orozco, [facility] N PAD PAD       Test Results Pending at Discharge: None    Electronically signed by HARSHAL Pettit, 10/11/24, 16:07 CDT.    Time: 40 minutes.

## 2024-10-11 NOTE — PROGRESS NOTES
I reviewed MRI Brain with patient and grand daughter showing right occipital stroke. Discussed secondary stroke risk factor reduction with ASA 81 mg, Lipitor 80 mg daily. Patient reports family history of statin intolerance and counseled to monitor and may need to reduce dose to 40 mg or change to Pravastatin. Also reviewed CTA neck for possible FMD. Renal artery US ordered but patient had eaten and radiology recommends NPO for 8 hours. Patient wants to go home and do as outpatient  She has been evaluated by therapy and cleared for home with outpatient PT. Ok from neuro standpoint to do as outpatient next week and f/u vascular surgery in 1-2 weeks.   NO DRIVNG UNTIL seen in follow up and will likely need visual field testing in 8 weeks.this was discussed with patient/grand daughter and they verbalized understanding.   Slowly reduce BP after day 3 of stroke with outpatient goal of less than 140.

## 2024-10-12 ENCOUNTER — HOSPITAL ENCOUNTER (OUTPATIENT)
Dept: ULTRASOUND IMAGING | Facility: HOSPITAL | Age: 69
Discharge: HOME | End: 2024-10-12
Payer: MEDICARE

## 2024-10-12 ENCOUNTER — APPOINTMENT (OUTPATIENT)
Dept: ULTRASOUND IMAGING | Facility: HOSPITAL | Age: 69
End: 2024-10-12
Payer: MEDICARE

## 2024-10-12 DIAGNOSIS — I77.3: ICD-10-CM

## 2024-10-12 PROCEDURE — 93975 VASCULAR STUDY: CPT

## 2024-10-12 PROCEDURE — 76775 US EXAM ABDO BACK WALL LIM: CPT

## 2024-10-12 NOTE — OUTREACH NOTE
Prep Survey      Flowsheet Row Responses   [employer] facility patient discharged from? [facility]   Is LACE score < 7 ? No   Eligibility Readm Mgmt   Discharge diagnosis Acute cortical and subcortical infarct in the right occipital lobe   Does the patient have one of the following disease processes/diagnoses(primary or secondary)? Stroke   Prep survey completed? Yes            Inga ALEJO - Registered Nurse

## 2024-10-12 NOTE — NURSING NOTE
Pt D/C home today with family. Belongings sent home. IV removed. Tele removed. Went over AVS and Stroke Folder with pt and family. Answered all of their questions.

## 2024-10-12 NOTE — THERAPY DISCHARGE NOTE
Acute Care - Physical Therapy Discharge Summary  [facility]       Patient Name: Nayely Malik  : 1955  MRN: [id number]    Today's Date: 10/12/2024                 Admit Date: 10/10/2024      PT Recommendation and Plan    Visit Dx:    ICD-10-CM ICD-9-CM   1. Stroke-like symptoms  R29.90 781.99   2. Visual disturbances  H53.9 368.9   3. Elevated blood pressure reading  R03.0 796.2   4. Impaired mobility [Z74.09]  Z74.09 799.89   5. 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 Fibromuscular dysplasia of left carotid artery, possible  I77.3 447.8                PT Rehab Goals       Row Name 10/12/24 0846             Transfer Goal 1 (PT)    Activity/Assistive Device (Transfer Goal 1, PT) sit-to-stand/stand-to-sit;bed-to-chair/chair-to-bed  -MF      [place] Level/Cues Needed (Transfer Goal 1, PT) independent  -MF      Time Frame (Transfer Goal 1, PT) long term goal (LTG);by discharge  -MF      Progress/Outcome (Transfer Goal 1, PT) goal not met  -MF         Gait Training Goal 1 (PT)    Activity/Assistive Device (Gait Training Goal 1, PT) gait (walking locomotion);decrease fall risk;increase endurance/gait distance;improve balance and speed  -MF      [place] Level (Gait Training Goal 1, PT) independent  -MF      Distance (Gait Training Goal 1, PT) 200ft  -MF      Time Frame (Gait Training Goal 1, PT) long term goal (LTG);by discharge  -MF      Progress/Outcome (Gait Training Goal 1, PT) goal not met  -MF                User Key  (r) = Recorded By, (t) = Taken By, (c) = Cosigned By      Initials Name Provider Type Discipline    June Ho, PTA Physical Therapist Assistant PT                        PT Discharge Summary  Anticipated Discharge Disposition (PT): home  Reason for Discharge: Discharge from facility  Outcomes Achieved: Discharge from facility occurred on same date as evluation  Discharge Destination: Home      June Srinivasan  PTA   10/12/2024

## 2024-10-13 ENCOUNTER — NURSE TRIAGE (OUTPATIENT)
Dept: CALL CENTER | Facility: HOSPITAL | Age: 69
End: 2024-10-13
Payer: MEDICARE

## 2024-10-13 NOTE — THERAPY DISCHARGE NOTE
Acute Care - Occupational Therapy Discharge Summary  [facility]     Patient Name: Nayely Malik  : 1955  MRN: [id number]    Today's Date: 10/13/2024                 Admit Date: 10/10/2024        OT Recommendation and Plan    Visit Dx:    ICD-10-CM ICD-9-CM   1. Stroke-like symptoms  R29.90 781.99   2. Visual disturbances  H53.9 368.9   3. Elevated blood pressure reading  R03.0 796.2   4. Impaired mobility [Z74.09]  Z74.09 799.89   5. 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 Fibromuscular dysplasia of left carotid artery, possible  I77.3 447.8                OT Rehab Goals       Row Name 10/13/24 1600             Problem Specific Goal 1 (OT)    Problem Specific Goal 1 (OT) Pt will independently implement visual scanning aids 100% of the time during functional tasks to improve safety and independence.  -MM      Time Frame (Problem Specific Goal 1, OT) long term goal (LTG);by discharge  -MM      Progress/Outcome (Problem Specific Goal 1, OT) goal not met;discharged from facility  -MM                User Key  (r) = Recorded By, (t) = Taken By, (c) = Cosigned By      Initials Name Provider Type Discipline    Kenneth Summers, OTR/L Occupational Therapist OT                                OT Discharge Summary  Reason for Discharge: Discharge from facility  Outcomes Achieved: Refer to plan of care for updates on goals achieved  Discharge Destination: Home with assist      NESTOR Alonso/SHOSHANA  10/13/2024

## 2024-10-13 NOTE — TELEPHONE ENCOUNTER
Patient recently discharged s/p stroke. C/o slightly increased difficulty swallowing this morning. Still able to eat, drink, and take medications. Reviewed protocol with patient's granddaughter. Advised to follow up with PCP tomorrow as scheduled.    Reason for Disposition   [1] Swallowing difficulty AND [2] cause unknown  (Exception: Difficulty swallowing is a chronic symptom.)    Additional Information   Negative: [1] Severe difficulty swallowing (e.g., drooling or spitting) AND [2] started suddenly after taking a medicine or allergic food   Negative: Wheezing, stridor, hoarseness, or difficulty breathing   Negative: [1] Swollen tongue AND [2] sudden onset   Negative: Sounds like a life-threatening emergency to the triager   Negative: Mouth ulcers are seen   Negative: Sore throat (throat pain with swallowing)   Negative: Swallowed a (non-edible) foreign body   Negative: Feeding tube, questions or concerns related to   Negative: Swelling of uvula   Negative: Swelling of tongue   Negative: SEVERE difficulty swallowing (e.g., drooling or spitting, can't swallow water)   Negative: [1] Symptoms of blocked esophagus (e.g., can't swallow normal secretions, drooling) AND [2] present now   Negative: Symptoms of food or bone stuck in throat or esophagus (e.g., pain in throat or chest, FB sensation, blood-tinged saliva)   Negative: SEVERE symptoms of pill stuck in throat or esophagus (e.g., severe pain, bleeding, or inability to swallow liquids)   Negative: [1] Drinking very little AND [2] dehydration suspected (e.g., no urine > 12 hours, very dry mouth, very lightheaded)   Negative: [1] Refuses to drink anything AND [2] for > 12 hours   Negative: Patient sounds very sick or weak to the triager   Negative: Fever > 100.4 F (38.0 C)   Negative: [1] Coughing spells AND [2] occur during eating/feedings or within 2 hours   Negative: [1] Symptoms of pill stuck in throat or esophagus (e.g., pain in throat or chest, FB sensation)  "AND [2] no relief after using Care Advice   Negative: Weak immune system (e.g., HIV positive, cancer chemo, splenectomy, organ transplant, chronic steroids)    Answer Assessment - Initial Assessment Questions  1. DESCRIPTION: \"Tell me more about this problem.\" \"Are you  having trouble swallowing liquids, solids, or both?\" \"Any trouble with swallowing saliva (spit)?\"      Slight increase in difficulty swallowing this morning  2. SEVERITY: \"How bad is the swallowing difficulty?\"  (e.g., Scale 1-10; or mild, moderate, severe)    - MILD (0-3): Occasional swallowing difficulty; has trouble swallowing certain types of foods or liquids.    - MODERATE (4-7): Frequent swallowing difficulty; only able to swallow small amounts of foods and fluids.    - SEVERE (8-10): Unable to swallow any foods, fluids, or saliva; sensation of \"lump in throat\" or \"something stuck in throat\", and frequent drooling or spitting may be present.      Mild   3. ONSET: \"When did the swallowing problems begin?\"       This morning  4. CAUSE: \"What do you think is causing the problem?\"  (e.g., dry mouth, food or pill stuck in throat, mouth pain, sore throat, progression of disease process such as dementia or Parkinson's disease).       Unknown, recent stroke  5. CHRONIC or RECURRENT: \"Is this a new problem for you?\"  If No, ask: \"How long have you had this problem?\" (e.g., days, weeks, months)       Since hospitalization  6. OTHER SYMPTOMS: \"Do you have any other symptoms?\" (e.g., chest pain, difficulty breathing, mouth sores, sore throat, swollen tongue, chest pain)      No   7. PREGNANCY: \"Is there any chance you are pregnant?\" \"When was your last menstrual period?\"      No    Protocols used: Swallowing Difficulty-ADULT-    "

## 2024-10-13 NOTE — TELEPHONE ENCOUNTER
"Caller/jono reports grandmother reported episode of  \"odd feeling\" that lasted approximately 1-2 seconds that started in chest and then out thru body. Denied it felt like numbness, tingling, shock or warmth feeling but could not describe in words.  Caller states is not currently at patient home but is going to go by and check on her since she was recently discharged from hospital with stroke like symptoms involving vision changes.  States BP was 168 systolic this a.m. but does not remember diastolic. States she is currently not feeling any continued \"odd sensations\".  Advised caller to check grandmother's BP, swallowing, and observe for any behavior differences when arrives to home. Encouraged to call back for any questions or concerns or any elevated BPS.  Verbalized understanding.  Reason for Disposition   [1] Numbness or tingling on both sides of body AND [2] is a new symptom present < 24 hours    Additional Information   Negative: [1] SEVERE weakness (i.e., unable to walk or barely able to walk, requires support) AND [2] new-onset or worsening   Negative: [1] Weakness (i.e., paralysis, loss of muscle strength) of the face, arm / hand, or leg / foot on one side of the body AND [2] sudden onset AND [3] present now  (Exception: Bell's palsy suspected [i.e., weakness only on one side of the face, developing over hours to days, no other symptoms].)   Negative: [1] Numbness (i.e., loss of sensation) of the face, arm / hand, or leg / foot on one side of the body AND [2] sudden onset AND [3] present now   Negative: [1] Loss of speech or garbled speech AND [2] sudden onset AND [3] present now   Negative: Difficult to awaken or acting confused (e.g., disoriented, slurred speech)   Negative: Sounds like a life-threatening emergency to the triager   Negative: Confusion, disorientation, or hallucinations is main symptom   Negative: Neck pain is main symptom (and having weakness, numbness, or tingling in arm / hand " because of neck pain)   Negative: Back pain is main symptom (and having weakness, numbness, or tingling in leg because of back pain)   Negative: Hand pain is main symptom (and having mild weakness, numbness, or tingling in hand related to hand pain)   Negative: Dizziness is main symptom   Negative: Vision loss or change is main symptom   Negative: Followed a head injury within last 3 days   Negative: Followed a neck injury within last 3 days   Negative: [1] Tingling in both hands and/or feet AND [2] breathing faster than normal AND [3] feels similar to prior panic attack or hyperventilation episode   Negative: Weakness in both sides of the body or weakness all over   Negative: Headache  (and neurologic deficit)   Negative: [1] Back pain AND [2] numbness (loss of sensation) in groin or rectal area   Negative: [1] Unable to urinate (or only a few drops) > 4 hours AND [2] bladder feels very full (e.g., palpable bladder or strong urge to urinate)   Negative: [1] Loss of bladder or bowel control (urine or bowel incontinence; wetting self, leaking stool) AND [2] new-onset   Negative: [1] Weakness (i.e., paralysis, loss of muscle strength) of the face, arm / hand, or leg / foot on one side of the body AND [2] sudden onset AND [3] brief (now gone)   Negative: [1] Numbness (i.e., loss of sensation) of the face, arm / hand, or leg / foot on one side of the body AND [2] sudden onset AND [3] brief (now gone)   Negative: [1] Loss of speech or garbled speech AND [2] sudden onset AND [3] brief (now gone)   Negative: Bell's palsy suspected (i.e., weakness on only one side of the face, developing over hours to days, no other symptoms)   Negative: Patient sounds very sick or weak to the triager   Negative: Neck pain (and neurologic deficit)   Negative: Back pain (and neurologic deficit)   Negative: [1] Weakness of the face, arm / hand, or leg / foot on one side of the body AND [2] gradual onset (e.g., days to weeks) AND [3] present  "now   Negative: [1] Numbness (i.e., loss of sensation) of the face, arm / hand, or leg / foot on one side of the body AND [2] gradual onset (e.g., days to weeks) AND [3] present now   Negative: [1] Loss of speech or garbled speech AND [2] gradual onset (e.g., days to weeks) AND [3] present now   Negative: [1] Tingling (e.g., pins and needles) of the face, arm / hand, or leg / foot on one side of the body AND [2] present now (Exceptions: Chronic or recurrent symptom lasting > 4 weeks; or tingling from known cause, such as: bumped elbow, carpal tunnel syndrome, pinched nerve, frostbite.)   Negative: [1] Loss of speech or garbled speech AND [2] is a chronic symptom (recurrent or ongoing AND present > 4 weeks)   Negative: [1] Weakness of arm / hand, or leg / foot AND [2] is a chronic symptom (recurrent or ongoing AND present > 4 weeks)   Negative: [1] Numbness or tingling in one or both hands AND [2] is a chronic symptom (recurrent or ongoing AND present > 4 weeks)   Negative: [1] Numbness or tingling in one or both feet AND [2] is a chronic symptom (recurrent or ongoing AND present > 4 weeks)   Negative: [1] Numbness or tingling on both sides of body AND [2] is a new symptom present > 24 hours   Negative: [1] Tingling in hand (e.g., pins and needles) AND [2] after prolonged lying on arm AND [3]  brief (now gone)   Negative: [1] Tingling in foot (e.g., pins and needles) AND [2] after prolonged sitting AND [3] brief (now gone)   Negative: [1] Bumped elbow AND [2] brief (now gone) numbness (or tingling or burning) in hand and fingers    Answer Assessment - Initial Assessment Questions  1. SYMPTOM: \"What is the main symptom you are concerned about?\" (e.g., weakness, numbness)      Caller states grandmother had episode of \"odd feeling\" that lasted approximately 1-2 seconds that started in chest and then out thru body. Denied it felt like numbness, tingling, shock or warmth feeling but could not describe in words.  2. ONSET: " "\"When did this start?\" (minutes, hours, days; while sleeping)      1 episode at approximately 1400  3. LAST NORMAL: \"When was the last time you (the patient) were normal (no symptoms)?\"      Currently at base line, episode lasted 1-2 seconds. No LOC, chest pain or SOA  4. PATTERN \"Does this come and go, or has it been constant since it started?\"  \"Is it present now?\"      Not present and only one episode  5. CARDIAC SYMPTOMS: \"Have you had any of the following symptoms: chest pain, difficulty breathing, palpitations?\"      Denies  6. NEUROLOGIC SYMPTOMS: \"Have you had any of the following symptoms: headache, dizziness, vision loss, double vision, changes in speech, unsteady on your feet?\"      Denies  7. OTHER SYMPTOMS: \"Do you have any other symptoms?\"      Denies  8. PREGNANCY: \"Is there any chance you are pregnant?\" \"When was your last menstrual period?\"      N/A    Protocols used: Neurologic Deficit-ADULT-AH    "

## 2024-10-16 ENCOUNTER — READMISSION MANAGEMENT (OUTPATIENT)
Dept: CALL CENTER | Facility: HOSPITAL | Age: 69
End: 2024-10-16
Payer: MEDICARE

## 2024-10-16 NOTE — OUTREACH NOTE
Stroke Week 1 Survey      Flowsheet Row Responses   [facility] patient discharged from? [place]   Does the patient have one of the following disease processes/diagnoses(primary or secondary)? Stroke   Week 1 attempt successful? Yes   Call start time 1424   Call end time 1430   Discharge diagnosis Acute cortical and subcortical infarct in the right occipital lobe   Meds reviewed with patient/caregiver? Yes   Is the patient having any side effects they believe may be caused by any medication additions or changes? No   Does the patient have all medications ordered at discharge? Yes   Is the patient taking all medications as directed (includes completed medication regime)? Yes   Does the patient have a primary care provider?  Yes   Does the patient have an appointment with their PCP within 7 days of discharge? No   What is preventing the patient from scheduling follow up appointments within 7 days of discharge? Waiting on return call   Has the patient kept scheduled appointments due by today? N/A   Has home health visited the patient within 72 hours of discharge? N/A   Psychosocial issues? No   Does the patient require any assistance with activities of daily living such as eating, bathing, dressing, walking, etc.? No   Does the patient have any residual symptoms from stroke/TIA? Yes   Residual symptoms comments Patient has no peripheral vision   Does the patient understand the diet ordered at discharge? Yes   Did the patient receive a copy of their discharge instructions? Yes   Nursing interventions Reviewed instructions with patient   What is the patient's perception of their health status since discharge? Improving   Is the patient/caregiver able to teach back the risk factors for a stroke? High blood pressure-goal below 120/80, High Cholesterol, Carotid or other artery disease, History of TIAs   Is the patient/caregiver able to teach back signs and symptoms related to disease process for when to call PCP? Yes    Is the patient/caregiver able to teach back signs and symptoms related to disease process for when to call 911? Yes   If the patient is a current smoker, are they able to teach back resources for cessation? Not a smoker   Is the patient/caregiver able to teach back the hierarchy of who to call/visit for symptoms/problems? PCP, Specialist, Home health nurse, Urgent Care, ED, 911 Yes   Is the patient able to teach back FAST for Stroke? B alance: Watch for sudden loss of balance, E yes: Check for vision loss, F ace: Look for an uneven smile, A rm: Check if one arm is weak, S peech: Listen for slurred speech, T miguel: Call 9-1-1 right away   Week 1 call completed? Yes   Call end time 1430            Lynda BARTH - Licensed Nurse

## 2024-10-24 ENCOUNTER — READMISSION MANAGEMENT (OUTPATIENT)
Dept: CALL CENTER | Facility: HOSPITAL | Age: 69
End: 2024-10-24
Payer: MEDICARE

## 2024-10-24 NOTE — OUTREACH NOTE
Stroke Week 2 Survey      Flowsheet Row Responses   [facility] patient discharged from? [place]   Does the patient have one of the following disease processes/diagnoses(primary or secondary)? Stroke   Week 2 attempt successful? Yes   Call start time 1213   Call end time 1223   Discharge diagnosis Acute cortical and subcortical infarct in the right occipital lobe   Person spoke with today (if not patient) and relationship Patient   Medication alerts for this patient Aspirin, Amaryl, Crestor   Meds reviewed with patient/caregiver? Yes   Is the patient having any side effects they believe may be caused by any medication additions or changes? No   Does the patient have all medications ordered at discharge? Yes   Is the patient taking all medications as directed (includes completed medication regime)? Yes   Does the patient have a primary care provider?  Yes   Comments regarding PCP PCP--Dr. Teto Chester--saw PCP yesterday for hospital f/u.   Has the patient kept scheduled appointments due by today? Yes   Has home health visited the patient within 72 hours of discharge? N/A   Psychosocial issues? No   Does the patient require any assistance with activities of daily living such as eating, bathing, dressing, walking, etc.? No   Does the patient have any residual symptoms from stroke/TIA? Yes   Residual symptoms comments Patient states her vision is still not back to normal. Loss of peripheral vision. She sees the Eye doctor again in 6 weeks.   Does the patient understand the diet ordered at discharge? Yes   Did the patient receive a copy of their discharge instructions? Yes   Nursing interventions Reviewed instructions with patient   What is the patient's perception of their health status since discharge? Improving   Nursing interventions Nurse provided patient education   Is the patient able to teach back FAST for Stroke? B alance: Watch for sudden loss of balance, E yes: Check for vision loss, F ace: Look for an  uneven smile, A rm: Check if one arm is weak, S peech: Listen for slurred speech, T miguel: Call 9-1-1 right away   Is the patient/caregiver able to teach back the risk factors for a stroke? High blood pressure-goal below 120/80   Is the patient/caregiver able to teach back signs and symptoms related to disease process for when to call PCP? Yes   Is the patient/caregiver able to teach back signs and symptoms related to disease process for when to call 911? Yes   If the patient is a current smoker, are they able to teach back resources for cessation? Not a smoker   Is the patient/caregiver able to teach back the hierarchy of who to call/visit for symptoms/problems? PCP, Specialist, Home health nurse, Urgent Care, ED, 911 Yes   Week 2 call completed? Yes   Revoked No further contact(revokes)-requires comment   Is the patient interested in additional calls from an ambulatory ? No   Would this patient benefit from a Referral to Amb Social Work? No   Graduated/Revoked comments Patient denies any needs or questions. Patient has f/u appts scheduled with eye doctor and has already seen PCP for f/u.   Call end time 1223            Mable BEGUM - Registered Nurse

## 2025-02-05 ENCOUNTER — TELEPHONE (OUTPATIENT)
Dept: NEUROLOGY | Facility: CLINIC | Age: 70
End: 2025-02-05

## 2025-02-05 NOTE — TELEPHONE ENCOUNTER
Patient had an appt to see Pradhan today 2/5/2025 at 10:30 am, she arrived at 10:48 am.  Per Pradhan, I let patient know about our 15 minute late policy and rescheduled her appt.     Patient was understanding.    CC

## 2025-03-06 ENCOUNTER — OFFICE VISIT (OUTPATIENT)
Dept: NEUROLOGY | Facility: CLINIC | Age: 70
End: 2025-03-06
Payer: MEDICARE

## 2025-03-06 VITALS
SYSTOLIC BLOOD PRESSURE: 142 MMHG | WEIGHT: 192 LBS | HEART RATE: 79 BPM | DIASTOLIC BLOOD PRESSURE: 80 MMHG | BODY MASS INDEX: 34.02 KG/M2 | OXYGEN SATURATION: 98 % | HEIGHT: 63 IN

## 2025-03-06 DIAGNOSIS — I10 ESSENTIAL HYPERTENSION: ICD-10-CM

## 2025-03-06 DIAGNOSIS — I77.3 FIBROMUSCULAR DYSPLASIA OF CAROTID ARTERY: ICD-10-CM

## 2025-03-06 DIAGNOSIS — E78.5 HYPERLIPIDEMIA LDL GOAL <70: ICD-10-CM

## 2025-03-06 DIAGNOSIS — H53.462 LEFT HOMONYMOUS HEMIANOPSIA DUE TO RECENT CEREBRAL INFARCTION: ICD-10-CM

## 2025-03-06 DIAGNOSIS — E11.49 TYPE 2 DIABETES MELLITUS WITH OTHER NEUROLOGIC COMPLICATION, WITHOUT LONG-TERM CURRENT USE OF INSULIN: ICD-10-CM

## 2025-03-06 DIAGNOSIS — I69.30 HISTORY OF STROKE WITH RESIDUAL EFFECTS: Primary | ICD-10-CM

## 2025-03-06 DIAGNOSIS — I69.398 LEFT HOMONYMOUS HEMIANOPSIA DUE TO RECENT CEREBRAL INFARCTION: ICD-10-CM

## 2025-03-06 RX ORDER — BISOPROLOL FUMARATE AND HYDROCHLOROTHIAZIDE 2.5; 6.25 MG/1; MG/1
1 TABLET ORAL DAILY
COMMUNITY
Start: 2025-01-18

## 2025-03-06 NOTE — PROGRESS NOTES
Neurology Consult Note    [facility]  [address], [address]  [place] [address]  Phone: [phone]  Fax: [phone]    Referring Provider:   No ref. provider found  Primary Care Provider:  Teto Chester MD    Reason for Consult:  Hospital follow-up for stroke  Subjective        Nayely Malik presents to [facility]    History of Present Illness  69-year-old female seen for hospital follow-up for stroke.  Reportedly patient presented to emergency room on 10/10/2020 for with complaints of vision problems out of her left eye.  Patient denied any speech problems or unilateral weakness.  No facial asymmetry.  She was seen by neurology the following day.      Dr. Hernandez's Neurology HPI:  istory of Present Illness:   Nayely Malik is a 69 y.o. female who is relatively healthy.  She only has a history of hypertension which she takes Ziac 2.5-6.25 mg 1/2 tablet/day.  Her granddaughter is in the room with her and is an excellent historian.  Yesterday morning the patient got up and drank 2 cups of coffee.  She was having some joint pain and therefore took a supplement called Curamed.  Within 1 hour she noticed some vision changes.  She believes that she had left peripheral vision loss in both eyes.  She also endorsed that her head did not feel right.  She had some visual floaters as well.  She also had some spots in her vision of decreased visual acuity.  Her symptoms were initially somewhat undulating and she described it as coming in waves.  She was concerned about a stroke and therefore had several tablets of cayenne pepper that she placed in water and drink the water.  She believes that this might abort a stroke.  She also took several sips of mistletoe which she also believes is beneficial for an acute stroke.  Her initial blood pressure was 186/102.  The family called a cousin of the patient's granddaughter.  She reportedly works at the ophthalmology  practice.  They drove from [place] to [place] and in route the patient became somewhat foggy headed and also lost her bearings forgetting where they were.  When she arrived at the ophthalmology office she was not acting appropriately per the granddaughter.  They immediately noticed that she had a left visual field cut and therefore the ophthalmologist recommended emergent transport to the ER.  When she arrived to the ER her blood pressure was found to be elevated.  She took more cayenne pepper on the way here.  As she was arriving in the ER her vision was somewhat improving.  She was then given a beta-blocker and hydralazine which dropped her pressure precipitously.  Her vision worsened after this.  She also endorses several months of tinnitus.  She continues to have it this morning.  She was admitted overnight.  A CT of the head without contrast and CT angiography was unremarkable.     Initial vital signs in ER and subsequent to their:      MRI did reveal acute cortical and subcortical infarctions above in the right occipital lobe.  She was managed on aspirin 81 mg daily as well as Crestor 40 mg.  She was also diagnosed with diabetes due to elevated A1c of 7.0.  Her lipid panel also revealed an elevated LDL at 131.  Her workup did reveal questionable fibromuscular dysplasia of the left carotid artery.  She did have a referral placed to vascular surgery.    Today patient presents her spouse.  Reports to me no new stroke symptoms since last being seen.  She does continue with left visual field deficits.  She has seen ophthalmology who instructed her she is no longer allowed to drive.  She does tell me she has stopped taking all of her medications besides aspirin and blood pressure medicine.  She tells me she does not want take statins as it caused side effects in her family members and cause diabetes and others.  She tells me she wants to be all-natural with controlling her cholesterol.  She is a family history of  "stroke in her sister and mother.  Other second line family members as well.  She is questioning the diabetes diagnosis.    She does tell me at the onset of her symptoms, she had gotten upset at someone who was talking about her family.  Reportedly her blood pressure went up and she felt a sensation that began in her neck and worked its way up.  She then again developed the vision abnormalities and sought emergency care.      Patient Active Problem List   Diagnosis    Acute cortical and subcortical infarct in the right occipital lobe    Visual disturbance    Essential hypertension    Type 2 diabetes mellitus    Fibromuscular dysplasia of left carotid artery, possible        Past Medical History:   Diagnosis Date    Diabetes mellitus Oct 2024    under control    Hypertension     Stroke Oct 2024    Vision loss         Social History     Socioeconomic History    Marital status:    Tobacco Use    Smoking status: Never    Smokeless tobacco: Never   Vaping Use    Vaping status: Never Used   Substance and Sexual Activity    Alcohol use: Never    Drug use: Never    Sexual activity: Defer        Allergies   Allergen Reactions    Latex Itching          Current Outpatient Medications:     aspirin 81 MG EC tablet, Take 1 tablet by mouth Daily., Disp: , Rfl:     bisoprolol-hydrochlorothiazide (ZIAC) 2.5-6.25 MG per tablet, Take 1 tablet by mouth Daily., Disp: , Rfl:        Objective   Vital Signs:   /80   Pulse 79   Ht 160 cm (63\")   Wt 87.1 kg (192 lb)   SpO2 98%   BMI 34.01 kg/m²       Physical Exam  Vitals and nursing note reviewed.   Constitutional:       Appearance: Normal appearance. She is obese.   HENT:      Head: Normocephalic.   Eyes:      General: Lids are normal.      Extraocular Movements: Extraocular movements intact.      Pupils: Pupils are equal, round, and reactive to light.   Pulmonary:      Effort: Pulmonary effort is normal. No respiratory distress.   Skin:     General: Skin is warm and dry. "   Neurological:      Mental Status: She is alert.      Motor: Motor strength is normal.     Deep Tendon Reflexes: Reflexes are normal and symmetric.   Psychiatric:         Speech: Speech normal.        Neurological Exam  Mental Status  Alert. Oriented to person, place, time and situation. Speech is normal. Language is fluent with no aphasia.    Cranial Nerves  CN II: Left homonymous hemianopsia.  CN III, IV, VI: Extraocular movements intact bilaterally. Normal lids and orbits bilaterally. Pupils equal round and reactive to light bilaterally.  CN V: Facial sensation is normal.  CN VII: Full and symmetric facial movement.  CN IX, X: Palate elevates symmetrically. Normal gag reflex.  CN XI: Shoulder shrug strength is normal.  CN XII: Tongue midline without atrophy or fasciculations.    Motor  Normal muscle bulk throughout. No fasciculations present. Normal muscle tone. No abnormal involuntary movements. Strength is 5/5 throughout all four extremities.    Sensory  Light touch is normal in upper and lower extremities.     Reflexes  Deep tendon reflexes are 2+ and symmetric in all four extremities.    Coordination  Right: Finger-to-nose normal.Left: Finger-to-nose normal.    Gait  Casual gait is normal including stance, stride, and arm swing.      Result Review :   The following data was reviewed by: CATIE Hart on 02/05/2025:  CMP          10/10/2024    16:51   CMP   Glucose 101    BUN 8    Creatinine 0.66    EGFR 95.7    Sodium 142    Potassium 3.7    Chloride 102    Calcium 9.7    Total Protein 7.8    Albumin 4.2    Globulin 3.6    Total Bilirubin 0.3    Alkaline Phosphatase 89    AST (SGOT) 48    ALT (SGPT) 41    Albumin/Globulin Ratio 1.2    BUN/Creatinine Ratio 12.1    Anion Gap 11.0      CBC w/diff          10/10/2024    16:51   CBC w/Diff   WBC 7.97    RBC 6.25    Hemoglobin 16.3    Hematocrit 50.8    MCV 81.3    MCH 26.1    MCHC 32.1    RDW 15.3    Platelets 208    Neutrophil Rel % 53.2    Immature  Granulocyte Rel % 0.1    Lymphocyte Rel % 38.9    Monocyte Rel % 5.0    Eosinophil Rel % 2.3    Basophil Rel % 0.5      Lipid Panel          10/11/2024    05:33   Lipid Panel   Total Cholesterol 207    Triglycerides 93    HDL Cholesterol 59    VLDL Cholesterol 17    LDL Cholesterol  131    LDL/HDL Ratio 2.19        Most Recent A1C          10/11/2024    05:33   HGBA1C Most Recent   Hemoglobin A1C 7.00      CT Head Without Contrast (10/10/2024 17:01)    Study Result    Narrative & Impression   EXAM: CT HEAD WO CONTRAST-      DATE: 10/10/2024 3:59 PM     HISTORY: Visual changes/strokelike symptoms       COMPARISON: None available.     DOSE LENGTH PRODUCT: 660.75 mGy.cm  Automated exposure control was also  utilized to decrease patient radiation dose.     TECHNIQUE: Unenhanced CT images obtained from vertex to skull base with  multiplanar reformats.     FINDINGS:  There is no acute intracranial hemorrhage, midline shift, mass effect,  or hydrocephalus. There is no CT evidence for acute infarct.     There are chronic changes with volume loss and chronic small vessel  ischemic change of the periventricular white matter.      SOFT TISSUES: The scalp soft tissues are unremarkable.        SINUS:The visualized paranasal sinuses and mastoid air cells are clear.      ORBITS: The visualized orbits and globes are unremarkable.        IMPRESSION:  1. Chronic changes and no acute intracranial findings.      This report was signed and finalized on 10/10/2024 5:14 PM by Trevon Wheeler.        CT Angiogram Head w AI Analysis of LVO (10/10/2024 17:01)    Study Result    Narrative & Impression   EXAM: CT ANGIOGRAM HEAD W AI ANALYSIS OF LVO-, CT ANGIOGRAM NECK- -  10/10/2024 3:59 PM     HISTORY: Visual changes/strokelike symptoms       COMPARISON: None available.     DOSE LENGTH PRODUCT: 397.83 mGy.cm. Automated exposure control was also  utilized to decrease patient radiation dose.     TECHNIQUE: CTA head and neck performed with  intravenous contrast. 3D  postprocessing, including MIPs, performed and images saved to PACS. AI  ANALYSIS OF LVO WAS UTILIZED.  Grading of carotid stenosis performed  with NASCET criteria.     FINDINGS:     CTA HEAD: Distal ICAs are patent and without flow-limiting stenosis. No  intracranial large vessel occlusion. Anterior and middle cerebral  arteries are patent and without flow-limiting stenosis. Intracranial  vertebral arteries and basilar artery are normal in caliber. Posterior  cerebral arteries and poterior circulation are patent and normal in  caliber. No visualized aneurysm or vascular malformation. The dural  venous sinuses are unremarkable with no filling defect. There is  calcification of the cavernous portion of the bilateral intracranial  internal carotid arteries but no significant stenosis is seen.     CTA NECK: The great vessels originate normally from the aortic arch.  There is mild atherosclerosis at the aortic arch.. Common carotid  arteries are patent and without flow-limiting stenosis. There is mild  atherosclerosis at the bilateral carotid bulbs without significant  stenosis. There is a beaded appearance of the mid to distal left  internal carotid artery may be due to fibromuscular dysplasia. The  vertebral arteries are opacified without significant ostial narrowing or  stenosis.  No evidence of vertebral artery dissection or pseudoaneurysm.     The submitted soft tissues of the neck are unremarkable..  Lung apices are clear.     IMPRESSION:     1. No arterial occlusion or flow-limiting stenosis in the neck. Question  fibromuscular dysplasia of the mid to distal left internal carotid  artery.  2. No intracranial large vessel occlusion or flow-limiting stenosis.     This report was signed and finalized on 10/10/2024 5:35 PM by Trevon Wheeler.     CT Angiogram Neck (10/10/2024 17:01)  Study Result    Narrative & Impression   EXAM: CT ANGIOGRAM HEAD W AI ANALYSIS OF LVO-, CT ANGIOGRAM NECK-  -  10/10/2024 3:59 PM     HISTORY: Visual changes/strokelike symptoms       COMPARISON: None available.     DOSE LENGTH PRODUCT: 397.83 mGy.cm. Automated exposure control was also  utilized to decrease patient radiation dose.     TECHNIQUE: CTA head and neck performed with intravenous contrast. 3D  postprocessing, including MIPs, performed and images saved to PACS. AI  ANALYSIS OF LVO WAS UTILIZED.  Grading of carotid stenosis performed  with NASCET criteria.     FINDINGS:     CTA HEAD: Distal ICAs are patent and without flow-limiting stenosis. No  intracranial large vessel occlusion. Anterior and middle cerebral  arteries are patent and without flow-limiting stenosis. Intracranial  vertebral arteries and basilar artery are normal in caliber. Posterior  cerebral arteries and poterior circulation are patent and normal in  caliber. No visualized aneurysm or vascular malformation. The dural  venous sinuses are unremarkable with no filling defect. There is  calcification of the cavernous portion of the bilateral intracranial  internal carotid arteries but no significant stenosis is seen.     CTA NECK: The great vessels originate normally from the aortic arch.  There is mild atherosclerosis at the aortic arch.. Common carotid  arteries are patent and without flow-limiting stenosis. There is mild  atherosclerosis at the bilateral carotid bulbs without significant  stenosis. There is a beaded appearance of the mid to distal left  internal carotid artery may be due to fibromuscular dysplasia. The  vertebral arteries are opacified without significant ostial narrowing or  stenosis.  No evidence of vertebral artery dissection or pseudoaneurysm.     The submitted soft tissues of the neck are unremarkable..  Lung apices are clear.     IMPRESSION:     1. No arterial occlusion or flow-limiting stenosis in the neck. Question  fibromuscular dysplasia of the mid to distal left internal carotid  artery.  2. No intracranial large  vessel occlusion or flow-limiting stenosis.     This report was signed and finalized on 10/10/2024 5:35 PM by Trevon Wheeler.        MRI Brain Without Contrast (10/11/2024 12:19)  Study Result    Narrative & Impression   EXAMINATION:  MRI BRAIN WO CONTRAST-  10/11/2024 11:00 AM     HISTORY: Vision loss; R29.90-Unspecified symptoms and signs involving  the nervous system; H53.9-Unspecified visual disturbance; R03.0-Elevated  blood-pressure reading, without diagnosis of hypertension; Z74.09-Other  reduced mobility.     TECHNIQUE: Multiplanar imaging was performed in a high field magnet.     COMPARISON: No comparison study.     FINDINGS: There is an acute cortical and subcortical infarct involving  the right occipital lobe. There is diffusion restriction on the ADC map  images. There are scattered areas of T2 high signal within the  hemispheric white matter. There is mild atrophy. There is minimal  prominence of the lateral ventricles. There is a cavum septum pellucida.  There is a partially empty appearance of the sella turcica. There is  some fluid in the optic nerve sheaths.        IMPRESSION:  1. Acute cortical and subcortical infarct in the right occipital lobe.  2. Mild atrophy with associated ventricular prominence. Cavum septum  pellucidum.  3. Scattered areas of T2 high signal within the hemispheric white matter  are nonspecific and likely due to chronic small vessel disease.        Results were called to Dr. Grissom.     This report was signed and finalized on 10/11/2024 12:51 PM by Dr. Larry Flannery MD.           US Renal Bilateral (10/12/2024 09:35)  Study Result    Narrative & Impression   EXAMINATION: US RENAL ARTERY COMPLETE-, US RENAL BILATERAL- 10/12/2024  10:11 AM     HISTORY: Evaluation of FMD; I77.3-Arterial fibromuscular dysplasia.     REPORT: Sonographic images of the kidneys were obtained bilaterally, the  examination includes color, spectral duplex Doppler evaluation of the  renal  arteries.     COMPARISON: There are no correlative imaging studies for comparison.     The abdominal aorta is patent, with with a peak systolic velocity of 68  cm/s.     The right kidney measures 10.5 x 4.8 x 4.7 cm and has normal morphology  and cortical echogenicity. No mass or hydronephrosis is identified. At  the origin of the right renal artery, the vessel is patent with a PSV of  140 cm/s, at the mid right renal artery, PSV measures 84 cm/s, and the  distal right renal artery is patent with a PSV of 37 cm/s, the right  arcuate branch has a PSV of 12 cm/s. The velocity ratios of the right  renal arteries compared with the abdominal aorta from proximal to distal  respectively 2.1, 1.2, 0.5 and 0.2. The resistive indices on the right  are from proximal to distal respectively 0.58, 0.59, 0.59 and 0.56.     Left kidney measures 10.7 x 5.8 x 5.5 cm and has normal morphology and  cortical echogenicity. The left renal artery origin is patent with a PSV  of 113 cm/s, the left mid renal artery PSV measures 134 cm/s, the distal  left renal artery PSV measures 157 cm/s in the left arcuate artery PSV  measures 13 cm/s. Corresponding velocity ratios of the left renal artery  as compared with the abdominal aorta from proximal to distal  respectively 1.7, with 2.0, 2.3 and 0.2. The resistive indices on the  left are from proximal to distal respectively 0.61, 0.64, 0.70 and 0.55.     Images of the right arm are unremarkable.     IMPRESSION:  1. No evidence of flow-limiting renal artery stenosis. CTA would be more  sensitive in detecting fibromuscular dysplasia. Normal morphological  appearance of both kidneys.     This report was signed and finalized on 10/12/2024 10:17 AM by Dr. Robby Funes MD.     US Renal Artery Complete (10/12/2024 09:35)    Study Result    Narrative & Impression   EXAMINATION: US RENAL ARTERY COMPLETE-, US RENAL BILATERAL- 10/12/2024  10:11 AM     HISTORY: Evaluation of FMD; I77.3-Arterial  fibromuscular dysplasia.     REPORT: Sonographic images of the kidneys were obtained bilaterally, the  examination includes color, spectral duplex Doppler evaluation of the  renal arteries.     COMPARISON: There are no correlative imaging studies for comparison.     The abdominal aorta is patent, with with a peak systolic velocity of 68  cm/s.     The right kidney measures 10.5 x 4.8 x 4.7 cm and has normal morphology  and cortical echogenicity. No mass or hydronephrosis is identified. At  the origin of the right renal artery, the vessel is patent with a PSV of  140 cm/s, at the mid right renal artery, PSV measures 84 cm/s, and the  distal right renal artery is patent with a PSV of 37 cm/s, the right  arcuate branch has a PSV of 12 cm/s. The velocity ratios of the right  renal arteries compared with the abdominal aorta from proximal to distal  respectively 2.1, 1.2, 0.5 and 0.2. The resistive indices on the right  are from proximal to distal respectively 0.58, 0.59, 0.59 and 0.56.     Left kidney measures 10.7 x 5.8 x 5.5 cm and has normal morphology and  cortical echogenicity. The left renal artery origin is patent with a PSV  of 113 cm/s, the left mid renal artery PSV measures 134 cm/s, the distal  left renal artery PSV measures 157 cm/s in the left arcuate artery PSV  measures 13 cm/s. Corresponding velocity ratios of the left renal artery  as compared with the abdominal aorta from proximal to distal  respectively 1.7, with 2.0, 2.3 and 0.2. The resistive indices on the  left are from proximal to distal respectively 0.61, 0.64, 0.70 and 0.55.     Images of the right arm are unremarkable.     IMPRESSION:  1. No evidence of flow-limiting renal artery stenosis. CTA would be more  sensitive in detecting fibromuscular dysplasia. Normal morphological  appearance of both kidneys.     This report was signed and finalized on 10/12/2024 10:17 AM by Dr. Robby Funes MD.   ED Provider Notes by Wyatt Swartz MD  (10/10/2024 16:36)  Consults by David Hernandez MD (10/11/2024 08:50)  Progress Notes by Norah Lopez APRN (10/11/2024 13:53)  Discharge Summary by , CATIE Vines (10/11/2024 14:09)                    Impression:  Nayely Malik is a 69 y.o. female who presents for hospital follow-up of stroke.  Etiology felt to be small vessel disease related to hypertension, new onset diabetes as well as uncontrolled hyperlipidemia.  Her workup overall has been benign.  Regards to the questionable fibromuscular dysplasia, I would recommend referral again to vascular surgery for further decision making.  However at this time I do not feel like any change to her treatment plan will be needed.  However again they will be deferred to vascular surgery.  I do recommend her to continue her aspirin 81 mg daily.  Additionally recommend statin therapy however patient is adamant against this.  She requested this naturally.  I do feel with her elevated LDL she would not likely be successful in this and likely need pharmacological intervention.  However we will refer this to the PCP for further management.  Additionally stroke prevention can be continued through primary care as no further workup is needed.  She will follow-up our clinic as needed.    Diagnoses and all orders for this visit:    1. History of stroke with residual effects (Primary)  -     Ambulatory Referral to Vascular Surgery    2. Fibromuscular dysplasia of left carotid artery, possible  -     Ambulatory Referral to Vascular Surgery    3. Left homonymous hemianopsia due to recent cerebral infarction    4. Essential hypertension    5. Hyperlipidemia LDL goal <70    6. Type 2 diabetes mellitus with other neurologic complication, without long-term current use of insulin        Plan:  Referral to vascular surgery for questionable fibromuscular dysplasia and management.  Continue aspirin 81 mg daily  Recommend statin therapy however patient refuses  Recommend  treatment for diabetes however patient refuses  LDL goal less than 70.  Currently 131.  A1c goal less than 6.5.  Currently 7.0.  BP goal less than 130/80.  Currently 142/80 in clinic.  Return to the emergency room for any new stroke symptoms.  Reviewed be-fast.  Recommend Mediterranean-style diet  Recommend increasing activities to include at least moderate intensity exercise for 20 minutes 3 times weekly  No driving due to left homonymous hemianopsia  Follow-up with primary care as scheduled  Follow-up with other specialist as scheduled  Follow-up in our clinic as needed    The patient and I have discussed the plan of care and she is in full agreement at this time.     Follow Up   Return if symptoms worsen or fail to improve, for Stroke/TIA.            Lorne Orozco, APRN  03/06/25  15:36 CST

## 2025-03-10 ENCOUNTER — PATIENT ROUNDING (BHMG ONLY) (OUTPATIENT)
Dept: NEUROLOGY | Facility: CLINIC | Age: 70
End: 2025-03-10
Payer: MEDICARE

## 2025-03-10 NOTE — PROGRESS NOTES
March 10, 2025    Hello, may I speak with Nayely Malik?    My name is eda      I am  with [facility]  [address]  [address]  [place] [address] [phone].    Before we get started may I verify your date of birth? 1955    I am calling to officially welcome you to our practice and ask about your recent visit. Is this a good time to talk? yes    Tell me about your visit with us. What things went well?  great       We're always looking for ways to make our patients' experiences even better. Do you have recommendations on ways we may improve?  no    Overall were you satisfied with your first visit to our practice? yes       I appreciate you taking the time to speak with me today. Is there anything else I can do for you? no      Thank you, and have a great day.

## 2025-04-02 ENCOUNTER — TELEPHONE (OUTPATIENT)
Dept: VASCULAR SURGERY | Facility: CLINIC | Age: 70
End: 2025-04-02
Payer: MEDICARE

## 2025-06-27 ENCOUNTER — TELEPHONE (OUTPATIENT)
Age: 70
End: 2025-06-27
Payer: MEDICARE

## 2025-06-27 NOTE — TELEPHONE ENCOUNTER
Incoming Refill Request      Medication requested (name and dose): bisoprolol htz 2.5/6.25; 1 tab once a day    [facility] where [facility]    Additional details provided by patient: less than three days left    Best call back number: [phone]    Does the patient have less than a 3 day supply:  [x] Yes  [] No    Herson Valdivia Rep  06/27/25, 15:27 CDT

## 2025-06-28 RX ORDER — BISOPROLOL FUMARATE AND HYDROCHLOROTHIAZIDE 2.5; 6.25 MG/1; MG/1
1 TABLET ORAL DAILY
Qty: 90 TABLET | Refills: 0 | Status: SHIPPED | OUTPATIENT
Start: 2025-06-28

## 2025-07-02 DIAGNOSIS — I10 PRIMARY HYPERTENSION: Primary | ICD-10-CM

## 2025-07-02 RX ORDER — BISOPROLOL FUMARATE AND HYDROCHLOROTHIAZIDE 2.5; 6.25 MG/1; MG/1
1 TABLET ORAL DAILY
Qty: 90 TABLET | Refills: 0 | Status: SHIPPED | OUTPATIENT
Start: 2025-07-02

## 2025-07-31 ENCOUNTER — OFFICE VISIT (OUTPATIENT)
Age: 70
End: 2025-07-31
Payer: MEDICARE

## 2025-07-31 VITALS
HEART RATE: 91 BPM | HEIGHT: 63 IN | SYSTOLIC BLOOD PRESSURE: 138 MMHG | OXYGEN SATURATION: 97 % | BODY MASS INDEX: 35.61 KG/M2 | RESPIRATION RATE: 16 BRPM | DIASTOLIC BLOOD PRESSURE: 92 MMHG | TEMPERATURE: 98.4 F | WEIGHT: 201 LBS

## 2025-07-31 DIAGNOSIS — I10 ESSENTIAL HYPERTENSION: Primary | ICD-10-CM

## 2025-07-31 DIAGNOSIS — Z86.2 HISTORY OF ANEMIA: ICD-10-CM

## 2025-07-31 DIAGNOSIS — Z86.73 HISTORY OF STROKE: ICD-10-CM

## 2025-07-31 DIAGNOSIS — E11.9 TYPE 2 DIABETES MELLITUS WITHOUT COMPLICATION, WITHOUT LONG-TERM CURRENT USE OF INSULIN: ICD-10-CM

## 2025-07-31 DIAGNOSIS — Z11.59 NEED FOR HEPATITIS C SCREENING TEST: ICD-10-CM

## 2025-07-31 DIAGNOSIS — I10 PRIMARY HYPERTENSION: ICD-10-CM

## 2025-07-31 DIAGNOSIS — I77.3 FIBROMUSCULAR DYSPLASIA OF CAROTID ARTERY: ICD-10-CM

## 2025-07-31 RX ORDER — BISOPROLOL FUMARATE AND HYDROCHLOROTHIAZIDE 2.5; 6.25 MG/1; MG/1
1 TABLET ORAL DAILY
Qty: 90 TABLET | Refills: 3 | Status: SHIPPED | OUTPATIENT
Start: 2025-07-31

## 2025-07-31 RX ORDER — BISOPROLOL FUMARATE AND HYDROCHLOROTHIAZIDE 2.5; 6.25 MG/1; MG/1
1 TABLET ORAL DAILY
Qty: 90 TABLET | Refills: 3 | Status: SHIPPED | OUTPATIENT
Start: 2025-07-31 | End: 2025-07-31

## 2025-07-31 NOTE — PROGRESS NOTES
"Chief Complaint  Med Refill, Establish Care, and Diabetes (Pt states she does not believe that she is diabetic due to at home blood sugar running in low 100s every day.)    Subjective        Nayely Malik presents to [facility]  History of Present Illness  Pt presents for medical follow up regarding hypertension. BP has been doing well at home. She states that her BP always goes up when she has to go to the doctor.  She had a stroke in October. 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 She was followed by Neurology- Dr. Parks. She is on an aspirin daily for anticoagulation. She has not seen Vascular that she can remember. She states that she had a hard time with her memory during that time from the stroke. She is on bisoprolol-hctz for hypertension. She is doing well with it.     Her A1c was 7 while in hospital in October. She was checking her glucometers at home for a while and she states that it was running around 110s-121. She hasn't checked it in a while. She states that it never got high- she states that it stayed under 130.    She had a history of anemia in the past and would like her Iron checked.     Objective   Vital Signs:  /92 (BP Location: Left arm, Patient Position: Sitting, Cuff Size: Adult)   Pulse 91   Temp 98.4 °F (36.9 °C) (Infrared)   Resp 16   Ht 160 cm (63\")   Wt 91.2 kg (201 lb)   SpO2 97%   BMI 35.61 kg/m²   Estimated body mass index is 35.61 kg/m² as calculated from the following:    Height as of this encounter: 160 cm (63\").    Weight as of this encounter: 91.2 kg (201 lb).          Physical Exam  Constitutional:       General: She is not in acute distress.     Appearance: Normal appearance. She is normal weight. She is not ill-appearing or toxic-appearing.   HENT:      Head: Normocephalic and atraumatic.      Nose: Nose normal.      Mouth/Throat:      Mouth: Mucous membranes are moist.      Pharynx: " Oropharynx is clear. No oropharyngeal exudate or posterior oropharyngeal erythema.   Eyes:      Conjunctiva/sclera: Conjunctivae normal.   Cardiovascular:      Rate and Rhythm: Normal rate and regular rhythm.      Heart sounds: Normal heart sounds.   Pulmonary:      Effort: Pulmonary effort is normal.      Breath sounds: Normal breath sounds.   Musculoskeletal:         General: No swelling, tenderness, deformity or signs of injury. Normal range of motion.      Cervical back: Normal range of motion.      Right lower leg: No edema.      Left lower leg: No edema.   Skin:     General: Skin is warm and dry.      Coloration: Skin is not jaundiced or pale.      Findings: No bruising, erythema, lesion or rash.   Neurological:      General: No focal deficit present.      Mental Status: She is alert and oriented to person, place, and time. Mental status is at baseline.   Psychiatric:         Mood and Affect: Mood normal.         Behavior: Behavior normal.         Thought Content: Thought content normal.         Judgment: Judgment normal.        Result Review :                Assessment and Plan   Diagnoses and all orders for this visit:    1. Essential hypertension (Primary)    2. Type 2 diabetes mellitus without complication, without long-term current use of insulin  -     Microalbumin / Creatinine Urine Ratio - Urine, Clean Catch; Future  -     Hemoglobin A1c; Future  -     CBC & Differential; Future  -     Comprehensive Metabolic Panel; Future  -     Lipid Panel; Future    3. Need for hepatitis C screening test  -     Hepatitis C antibody; Future    4. History of stroke  -     Iron; Future    5. Fibromuscular dysplasia of left carotid artery, possible    6. History of anemia  -     Iron; Future    7. Primary hypertension  -     Discontinue: bisoprolol-hydrochlorothiazide (ZIAC) 2.5-6.25 MG per tablet; Take 1 tablet by mouth Daily.  Dispense: 90 tablet; Refill: 3  -     bisoprolol-hydrochlorothiazide (ZIAC) 2.5-6.25 MG per  tablet; Take 1 tablet by mouth Daily.  Dispense: 90 tablet; Refill: 3      She will return fasting for labs. Continue ASA 81 mg qd and will refer to [facility] due to CT angiogram showing possible fibromusclar dysplasia of left carotid artery.     Patient to continue with continuous, comprehensive, coordinated primary care that services for continuing focal point for all needed healthcare services related to her complex medical conditions.  Reviewed her complex diagnosis, comorbid conditions, and history at this time as well as associated labs and medication list for possible interactions.  Due to multiple comorbid conditions that will persist over time this added to the complexity of the visit.        Follow Up   Return in about 6 months (around 1/31/2026).

## 2025-08-04 ENCOUNTER — LAB (OUTPATIENT)
Age: 70
End: 2025-08-04
Payer: MEDICARE

## 2025-08-04 DIAGNOSIS — E11.9 TYPE 2 DIABETES MELLITUS WITHOUT COMPLICATION, WITHOUT LONG-TERM CURRENT USE OF INSULIN: Primary | ICD-10-CM

## 2025-08-14 ENCOUNTER — TELEPHONE (OUTPATIENT)
Age: 70
End: 2025-08-14
Payer: MEDICARE